# Patient Record
Sex: MALE | Race: WHITE | Employment: UNEMPLOYED | URBAN - METROPOLITAN AREA
[De-identification: names, ages, dates, MRNs, and addresses within clinical notes are randomized per-mention and may not be internally consistent; named-entity substitution may affect disease eponyms.]

---

## 2023-10-12 ENCOUNTER — PHARMACY VISIT (OUTPATIENT)
Dept: PHARMACY | Facility: CLINIC | Age: 53
End: 2023-10-12
Payer: COMMERCIAL

## 2023-10-12 PROCEDURE — RXMED WILLOW AMBULATORY MEDICATION CHARGE

## 2023-11-10 ENCOUNTER — PHARMACY VISIT (OUTPATIENT)
Dept: PHARMACY | Facility: CLINIC | Age: 53
End: 2023-11-10
Payer: COMMERCIAL

## 2023-11-10 PROCEDURE — RXMED WILLOW AMBULATORY MEDICATION CHARGE

## 2024-05-14 ENCOUNTER — APPOINTMENT (OUTPATIENT)
Dept: RADIOLOGY | Facility: HOSPITAL | Age: 54
End: 2024-05-14

## 2024-05-14 ENCOUNTER — HOSPITAL ENCOUNTER (INPATIENT)
Facility: HOSPITAL | Age: 54
LOS: 7 days | Discharge: HOME | End: 2024-05-21
Attending: STUDENT IN AN ORGANIZED HEALTH CARE EDUCATION/TRAINING PROGRAM | Admitting: INTERNAL MEDICINE

## 2024-05-14 ENCOUNTER — APPOINTMENT (OUTPATIENT)
Dept: CARDIOLOGY | Facility: HOSPITAL | Age: 54
End: 2024-05-14

## 2024-05-14 DIAGNOSIS — I26.99 ACUTE PULMONARY EMBOLISM, UNSPECIFIED PULMONARY EMBOLISM TYPE, UNSPECIFIED WHETHER ACUTE COR PULMONALE PRESENT (MULTI): Primary | ICD-10-CM

## 2024-05-14 DIAGNOSIS — I10 HYPERTENSION, UNSPECIFIED TYPE: ICD-10-CM

## 2024-05-14 DIAGNOSIS — I27.82 CHRONIC PULMONARY EMBOLISM (MULTI): ICD-10-CM

## 2024-05-14 DIAGNOSIS — L85.3 DRY SKIN: ICD-10-CM

## 2024-05-14 DIAGNOSIS — Z79.899 LONG TERM CURRENT USE OF DIURETIC: ICD-10-CM

## 2024-05-14 DIAGNOSIS — Z79.01 ON CONTINUOUS ORAL ANTICOAGULATION: ICD-10-CM

## 2024-05-14 DIAGNOSIS — S91.301A OPEN WOUND OF RIGHT FOOT, INITIAL ENCOUNTER: ICD-10-CM

## 2024-05-14 LAB
ALBUMIN SERPL-MCNC: 3.5 G/DL (ref 3.5–5)
ALP BLD-CCNC: 108 U/L (ref 35–125)
ALT SERPL-CCNC: 16 U/L (ref 5–40)
ANION GAP SERPL CALC-SCNC: 14 MMOL/L
APTT PPP: 27.1 SECONDS (ref 22–32.5)
APTT PPP: 39 SECONDS (ref 22–32.5)
AST SERPL-CCNC: 19 U/L (ref 5–40)
BASOPHILS # BLD AUTO: 0.03 X10*3/UL (ref 0–0.1)
BASOPHILS NFR BLD AUTO: 0.4 %
BILIRUB SERPL-MCNC: 0.7 MG/DL (ref 0.1–1.2)
BUN SERPL-MCNC: 14 MG/DL (ref 8–25)
CALCIUM SERPL-MCNC: 8.7 MG/DL (ref 8.5–10.4)
CHLORIDE SERPL-SCNC: 101 MMOL/L (ref 97–107)
CO2 SERPL-SCNC: 21 MMOL/L (ref 24–31)
CREAT SERPL-MCNC: 1 MG/DL (ref 0.4–1.6)
EGFRCR SERPLBLD CKD-EPI 2021: 90 ML/MIN/1.73M*2
EOSINOPHIL # BLD AUTO: 0.23 X10*3/UL (ref 0–0.7)
EOSINOPHIL NFR BLD AUTO: 2.9 %
ERYTHROCYTE [DISTWIDTH] IN BLOOD BY AUTOMATED COUNT: 13.5 % (ref 11.5–14.5)
ERYTHROCYTE [DISTWIDTH] IN BLOOD BY AUTOMATED COUNT: 13.9 % (ref 11.5–14.5)
EST. AVERAGE GLUCOSE BLD GHB EST-MCNC: 260 MG/DL
GLUCOSE BLD MANUAL STRIP-MCNC: 282 MG/DL (ref 74–99)
GLUCOSE BLD MANUAL STRIP-MCNC: 343 MG/DL (ref 74–99)
GLUCOSE SERPL-MCNC: 383 MG/DL (ref 65–99)
HBA1C MFR BLD: 10.7 %
HCT VFR BLD AUTO: 40.8 % (ref 41–52)
HCT VFR BLD AUTO: 42.2 % (ref 41–52)
HGB BLD-MCNC: 13.7 G/DL (ref 13.5–17.5)
HGB BLD-MCNC: 14.1 G/DL (ref 13.5–17.5)
IMM GRANULOCYTES # BLD AUTO: 0.03 X10*3/UL (ref 0–0.7)
IMM GRANULOCYTES NFR BLD AUTO: 0.4 % (ref 0–0.9)
LYMPHOCYTES # BLD AUTO: 1.27 X10*3/UL (ref 1.2–4.8)
LYMPHOCYTES NFR BLD AUTO: 15.8 %
MCH RBC QN AUTO: 29 PG (ref 26–34)
MCH RBC QN AUTO: 29.1 PG (ref 26–34)
MCHC RBC AUTO-ENTMCNC: 33.4 G/DL (ref 32–36)
MCHC RBC AUTO-ENTMCNC: 33.6 G/DL (ref 32–36)
MCV RBC AUTO: 86 FL (ref 80–100)
MCV RBC AUTO: 87 FL (ref 80–100)
MONOCYTES # BLD AUTO: 0.57 X10*3/UL (ref 0.1–1)
MONOCYTES NFR BLD AUTO: 7.1 %
NEUTROPHILS # BLD AUTO: 5.92 X10*3/UL (ref 1.2–7.7)
NEUTROPHILS NFR BLD AUTO: 73.4 %
NRBC BLD-RTO: 0 /100 WBCS (ref 0–0)
NRBC BLD-RTO: 0 /100 WBCS (ref 0–0)
NT-PROBNP SERPL-MCNC: 459 PG/ML (ref 0–138)
PLATELET # BLD AUTO: 221 X10*3/UL (ref 150–450)
PLATELET # BLD AUTO: 238 X10*3/UL (ref 150–450)
POTASSIUM SERPL-SCNC: 4.2 MMOL/L (ref 3.4–5.1)
PROT SERPL-MCNC: 7.1 G/DL (ref 5.9–7.9)
RBC # BLD AUTO: 4.72 X10*6/UL (ref 4.5–5.9)
RBC # BLD AUTO: 4.85 X10*6/UL (ref 4.5–5.9)
SARS-COV-2 RNA RESP QL NAA+PROBE: NOT DETECTED
SODIUM SERPL-SCNC: 136 MMOL/L (ref 133–145)
TROPONIN T SERPL-MCNC: 33 NG/L
TROPONIN T SERPL-MCNC: 63 NG/L
TROPONIN T SERPL-MCNC: 67 NG/L
WBC # BLD AUTO: 8 X10*3/UL (ref 4.4–11.3)
WBC # BLD AUTO: 8.1 X10*3/UL (ref 4.4–11.3)

## 2024-05-14 PROCEDURE — 0HDMXZZ EXTRACTION OF RIGHT FOOT SKIN, EXTERNAL APPROACH: ICD-10-PCS | Performed by: STUDENT IN AN ORGANIZED HEALTH CARE EDUCATION/TRAINING PROGRAM

## 2024-05-14 PROCEDURE — 99291 CRITICAL CARE FIRST HOUR: CPT | Mod: 25 | Performed by: STUDENT IN AN ORGANIZED HEALTH CARE EDUCATION/TRAINING PROGRAM

## 2024-05-14 PROCEDURE — 2550000001 HC RX 255 CONTRASTS: Performed by: STUDENT IN AN ORGANIZED HEALTH CARE EDUCATION/TRAINING PROGRAM

## 2024-05-14 PROCEDURE — 96374 THER/PROPH/DIAG INJ IV PUSH: CPT

## 2024-05-14 PROCEDURE — 2500000005 HC RX 250 GENERAL PHARMACY W/O HCPCS: Performed by: INTERNAL MEDICINE

## 2024-05-14 PROCEDURE — 84484 ASSAY OF TROPONIN QUANT: CPT | Performed by: STUDENT IN AN ORGANIZED HEALTH CARE EDUCATION/TRAINING PROGRAM

## 2024-05-14 PROCEDURE — 2500000004 HC RX 250 GENERAL PHARMACY W/ HCPCS (ALT 636 FOR OP/ED)

## 2024-05-14 PROCEDURE — 36415 COLL VENOUS BLD VENIPUNCTURE: CPT | Performed by: STUDENT IN AN ORGANIZED HEALTH CARE EDUCATION/TRAINING PROGRAM

## 2024-05-14 PROCEDURE — 93970 EXTREMITY STUDY: CPT

## 2024-05-14 PROCEDURE — 2020000001 HC ICU ROOM DAILY

## 2024-05-14 PROCEDURE — 85730 THROMBOPLASTIN TIME PARTIAL: CPT | Performed by: STUDENT IN AN ORGANIZED HEALTH CARE EDUCATION/TRAINING PROGRAM

## 2024-05-14 PROCEDURE — 99291 CRITICAL CARE FIRST HOUR: CPT | Performed by: INTERNAL MEDICINE

## 2024-05-14 PROCEDURE — 85027 COMPLETE CBC AUTOMATED: CPT | Performed by: STUDENT IN AN ORGANIZED HEALTH CARE EDUCATION/TRAINING PROGRAM

## 2024-05-14 PROCEDURE — 93005 ELECTROCARDIOGRAM TRACING: CPT

## 2024-05-14 PROCEDURE — 93971 EXTREMITY STUDY: CPT | Performed by: RADIOLOGY

## 2024-05-14 PROCEDURE — 82947 ASSAY GLUCOSE BLOOD QUANT: CPT

## 2024-05-14 PROCEDURE — 85025 COMPLETE CBC W/AUTO DIFF WBC: CPT | Performed by: STUDENT IN AN ORGANIZED HEALTH CARE EDUCATION/TRAINING PROGRAM

## 2024-05-14 PROCEDURE — 83880 ASSAY OF NATRIURETIC PEPTIDE: CPT | Performed by: STUDENT IN AN ORGANIZED HEALTH CARE EDUCATION/TRAINING PROGRAM

## 2024-05-14 PROCEDURE — 71275 CT ANGIOGRAPHY CHEST: CPT | Performed by: RADIOLOGY

## 2024-05-14 PROCEDURE — 2500000002 HC RX 250 W HCPCS SELF ADMINISTERED DRUGS (ALT 637 FOR MEDICARE OP, ALT 636 FOR OP/ED): Performed by: INTERNAL MEDICINE

## 2024-05-14 PROCEDURE — 2500000004 HC RX 250 GENERAL PHARMACY W/ HCPCS (ALT 636 FOR OP/ED): Performed by: STUDENT IN AN ORGANIZED HEALTH CARE EDUCATION/TRAINING PROGRAM

## 2024-05-14 PROCEDURE — 71275 CT ANGIOGRAPHY CHEST: CPT

## 2024-05-14 PROCEDURE — 87635 SARS-COV-2 COVID-19 AMP PRB: CPT | Performed by: STUDENT IN AN ORGANIZED HEALTH CARE EDUCATION/TRAINING PROGRAM

## 2024-05-14 PROCEDURE — 80053 COMPREHEN METABOLIC PANEL: CPT | Performed by: STUDENT IN AN ORGANIZED HEALTH CARE EDUCATION/TRAINING PROGRAM

## 2024-05-14 PROCEDURE — 83036 HEMOGLOBIN GLYCOSYLATED A1C: CPT | Performed by: INTERNAL MEDICINE

## 2024-05-14 RX ORDER — HEPARIN SODIUM 5000 [USP'U]/ML
10000 INJECTION, SOLUTION INTRAVENOUS; SUBCUTANEOUS ONCE
Status: COMPLETED | OUTPATIENT
Start: 2024-05-14 | End: 2024-05-14

## 2024-05-14 RX ORDER — HEPARIN SODIUM 5000 [USP'U]/ML
5000-10000 INJECTION, SOLUTION INTRAVENOUS; SUBCUTANEOUS AS NEEDED
Status: DISCONTINUED | OUTPATIENT
Start: 2024-05-14 | End: 2024-05-14

## 2024-05-14 RX ORDER — DEXTROSE 50 % IN WATER (D50W) INTRAVENOUS SYRINGE
25
Status: DISCONTINUED | OUTPATIENT
Start: 2024-05-14 | End: 2024-05-21 | Stop reason: HOSPADM

## 2024-05-14 RX ORDER — INSULIN GLARGINE 100 [IU]/ML
20 INJECTION, SOLUTION SUBCUTANEOUS NIGHTLY
Status: DISCONTINUED | OUTPATIENT
Start: 2024-05-14 | End: 2024-05-15

## 2024-05-14 RX ORDER — HEPARIN SODIUM 10000 [USP'U]/100ML
0-4500 INJECTION, SOLUTION INTRAVENOUS CONTINUOUS
Status: DISCONTINUED | OUTPATIENT
Start: 2024-05-14 | End: 2024-05-14

## 2024-05-14 RX ORDER — DEXTROSE 50 % IN WATER (D50W) INTRAVENOUS SYRINGE
12.5
Status: DISCONTINUED | OUTPATIENT
Start: 2024-05-14 | End: 2024-05-21 | Stop reason: HOSPADM

## 2024-05-14 RX ORDER — HEPARIN SODIUM 5000 [USP'U]/ML
5000-10000 INJECTION, SOLUTION INTRAVENOUS; SUBCUTANEOUS AS NEEDED
Status: DISCONTINUED | OUTPATIENT
Start: 2024-05-14 | End: 2024-05-21 | Stop reason: SDUPTHER

## 2024-05-14 RX ORDER — HEPARIN SODIUM 10000 [USP'U]/100ML
0-4500 INJECTION, SOLUTION INTRAVENOUS CONTINUOUS
Status: DISCONTINUED | OUTPATIENT
Start: 2024-05-14 | End: 2024-05-21

## 2024-05-14 RX ORDER — FUROSEMIDE 10 MG/ML
20 INJECTION INTRAMUSCULAR; INTRAVENOUS ONCE
Status: COMPLETED | OUTPATIENT
Start: 2024-05-14 | End: 2024-05-14

## 2024-05-14 RX ORDER — FUROSEMIDE 20 MG/1
20 TABLET ORAL DAILY
COMMUNITY

## 2024-05-14 RX ADMIN — Medication 4 L/MIN: at 20:00

## 2024-05-14 RX ADMIN — FUROSEMIDE 20 MG: 10 INJECTION, SOLUTION INTRAMUSCULAR; INTRAVENOUS at 21:16

## 2024-05-14 RX ADMIN — INSULIN GLARGINE 20 UNITS: 100 INJECTION, SOLUTION SUBCUTANEOUS at 20:38

## 2024-05-14 RX ADMIN — IOHEXOL 75 ML: 350 INJECTION, SOLUTION INTRAVENOUS at 11:17

## 2024-05-14 RX ADMIN — HEPARIN SODIUM 2000 UNITS/HR: 10000 INJECTION, SOLUTION INTRAVENOUS at 12:32

## 2024-05-14 RX ADMIN — INSULIN HUMAN 2 UNITS: 100 INJECTION, SOLUTION PARENTERAL at 20:38

## 2024-05-14 RX ADMIN — HEPARIN SODIUM 10000 UNITS: 5000 INJECTION, SOLUTION INTRAVENOUS; SUBCUTANEOUS at 12:29

## 2024-05-14 RX ADMIN — Medication 4 L/MIN: at 18:20

## 2024-05-14 SDOH — SOCIAL STABILITY: SOCIAL INSECURITY: WERE YOU ABLE TO COMPLETE ALL THE BEHAVIORAL HEALTH SCREENINGS?: YES

## 2024-05-14 SDOH — SOCIAL STABILITY: SOCIAL INSECURITY: DOES ANYONE TRY TO KEEP YOU FROM HAVING/CONTACTING OTHER FRIENDS OR DOING THINGS OUTSIDE YOUR HOME?: NO

## 2024-05-14 SDOH — SOCIAL STABILITY: SOCIAL INSECURITY: HAVE YOU HAD THOUGHTS OF HARMING ANYONE ELSE?: NO

## 2024-05-14 SDOH — SOCIAL STABILITY: SOCIAL INSECURITY: HAS ANYONE EVER THREATENED TO HURT YOUR FAMILY OR YOUR PETS?: NO

## 2024-05-14 SDOH — SOCIAL STABILITY: SOCIAL INSECURITY: DO YOU FEEL ANYONE HAS EXPLOITED OR TAKEN ADVANTAGE OF YOU FINANCIALLY OR OF YOUR PERSONAL PROPERTY?: NO

## 2024-05-14 SDOH — SOCIAL STABILITY: SOCIAL INSECURITY: DO YOU FEEL UNSAFE GOING BACK TO THE PLACE WHERE YOU ARE LIVING?: NO

## 2024-05-14 SDOH — SOCIAL STABILITY: SOCIAL INSECURITY: HAVE YOU HAD ANY THOUGHTS OF HARMING ANYONE ELSE?: NO

## 2024-05-14 SDOH — SOCIAL STABILITY: SOCIAL INSECURITY: ARE YOU OR HAVE YOU BEEN THREATENED OR ABUSED PHYSICALLY, EMOTIONALLY, OR SEXUALLY BY ANYONE?: NO

## 2024-05-14 SDOH — SOCIAL STABILITY: SOCIAL INSECURITY: ABUSE: ADULT

## 2024-05-14 SDOH — SOCIAL STABILITY: SOCIAL INSECURITY: ARE THERE ANY APPARENT SIGNS OF INJURIES/BEHAVIORS THAT COULD BE RELATED TO ABUSE/NEGLECT?: NO

## 2024-05-14 ASSESSMENT — PATIENT HEALTH QUESTIONNAIRE - PHQ9
1. LITTLE INTEREST OR PLEASURE IN DOING THINGS: NOT AT ALL
2. FEELING DOWN, DEPRESSED OR HOPELESS: NOT AT ALL
SUM OF ALL RESPONSES TO PHQ9 QUESTIONS 1 & 2: 0

## 2024-05-14 ASSESSMENT — ENCOUNTER SYMPTOMS
EYE REDNESS: 0
SHORTNESS OF BREATH: 1
APPETITE CHANGE: 0
EYE PAIN: 0
FEVER: 0
COUGH: 0
CHEST TIGHTNESS: 0
WHEEZING: 1
FATIGUE: 0
CHILLS: 0
UNEXPECTED WEIGHT CHANGE: 0
EYE ITCHING: 0

## 2024-05-14 ASSESSMENT — ACTIVITIES OF DAILY LIVING (ADL)
ADEQUATE_TO_COMPLETE_ADL: YES
JUDGMENT_ADEQUATE_SAFELY_COMPLETE_DAILY_ACTIVITIES: YES
TOILETING: INDEPENDENT
HEARING - RIGHT EAR: FUNCTIONAL
BATHING: INDEPENDENT
GROOMING: INDEPENDENT
DRESSING YOURSELF: INDEPENDENT
WALKS IN HOME: INDEPENDENT
LACK_OF_TRANSPORTATION: NO
FEEDING YOURSELF: INDEPENDENT
PATIENT'S MEMORY ADEQUATE TO SAFELY COMPLETE DAILY ACTIVITIES?: YES
HEARING - LEFT EAR: FUNCTIONAL

## 2024-05-14 ASSESSMENT — COGNITIVE AND FUNCTIONAL STATUS - GENERAL
MOBILITY SCORE: 24
PATIENT BASELINE BEDBOUND: NO
DAILY ACTIVITIY SCORE: 24

## 2024-05-14 ASSESSMENT — LIFESTYLE VARIABLES
TOTAL SCORE: 0
EVER FELT BAD OR GUILTY ABOUT YOUR DRINKING: NO
AUDIT-C TOTAL SCORE: 0
HOW MANY STANDARD DRINKS CONTAINING ALCOHOL DO YOU HAVE ON A TYPICAL DAY: PATIENT DOES NOT DRINK
HOW OFTEN DO YOU HAVE A DRINK CONTAINING ALCOHOL: NEVER
PRESCIPTION_ABUSE_PAST_12_MONTHS: NO
HOW OFTEN DO YOU HAVE 6 OR MORE DRINKS ON ONE OCCASION: NEVER
HAVE YOU EVER FELT YOU SHOULD CUT DOWN ON YOUR DRINKING: NO
HAVE PEOPLE ANNOYED YOU BY CRITICIZING YOUR DRINKING: NO
EVER HAD A DRINK FIRST THING IN THE MORNING TO STEADY YOUR NERVES TO GET RID OF A HANGOVER: NO
SKIP TO QUESTIONS 9-10: 1
AUDIT-C TOTAL SCORE: 0
SUBSTANCE_ABUSE_PAST_12_MONTHS: NO

## 2024-05-14 ASSESSMENT — PAIN SCALES - GENERAL
PAINLEVEL_OUTOF10: 0 - NO PAIN
PAINLEVEL_OUTOF10: 0 - NO PAIN

## 2024-05-14 ASSESSMENT — PAIN - FUNCTIONAL ASSESSMENT
PAIN_FUNCTIONAL_ASSESSMENT: 0-10
PAIN_FUNCTIONAL_ASSESSMENT: 0-10

## 2024-05-14 ASSESSMENT — COLUMBIA-SUICIDE SEVERITY RATING SCALE - C-SSRS
1. IN THE PAST MONTH, HAVE YOU WISHED YOU WERE DEAD OR WISHED YOU COULD GO TO SLEEP AND NOT WAKE UP?: NO
2. HAVE YOU ACTUALLY HAD ANY THOUGHTS OF KILLING YOURSELF?: NO
6. HAVE YOU EVER DONE ANYTHING, STARTED TO DO ANYTHING, OR PREPARED TO DO ANYTHING TO END YOUR LIFE?: NO

## 2024-05-14 NOTE — H&P
History Of Present Illness  Mina Huitron is a 53 y.o. male with VTE, HTN, DLP, DM with neuropathy and diabetic foot, chronic anemia, obesity, who presented with SOB x few days. In the ED work up revealed b/l PE. PERT team was consulted who recommended heparin drip and ICU monitoring. Subsequently is being admitted to ICU.   States his SOB started 3 days ago, sudden onset, progressive. Mostly with activity, but YANCEY after 10 feet or climbing a few steps. Associated with wheezing, palpitation and LH. No orthopnea, PND. +ve LE edema which is chronic without any recent worsening. Currently not on Warfarin (only received 6 months after his first episode). Denies recent travel or illness.    Past Medical History  VTE, HTN, DLP, DM with neuropathy and diabetic foot, chronic anemia, obesity  Surgical History  LE wound surgery, toe amputation   Social History  Never smoked, worked as a , no known exposures  Family History  +ve for HTN, DM, CHF, cancer   Allergies  Patient has no known allergies.  Review of Systems   Constitutional:  Negative for appetite change, chills, fatigue, fever and unexpected weight change.   HENT:  Negative for congestion, postnasal drip, rhinorrhea, sinus pressure, sinus pain and sore throat.    Eyes:  Negative for pain, redness, itching and visual disturbance.   Respiratory:  Positive for shortness of breath and wheezing. Negative for cough and chest tightness.    Cardiovascular:  Positive for palpitations and leg swelling. Negative for chest pain.   Gastrointestinal:  Negative for abdominal pain, constipation, diarrhea, nausea and vomiting.   Genitourinary:  Negative for dysuria, flank pain and hematuria.   Musculoskeletal:  Negative for arthralgias, back pain, myalgias and neck pain.   Skin:  Positive for rash and wound. Negative for pallor.   Neurological:  Positive for dizziness and light-headedness. Negative for seizures, syncope and headaches.   Psychiatric/Behavioral:  Negative  for confusion and dysphoric mood. The patient is not nervous/anxious.    Scheduled medications  oxygen, , inhalation, Continuous - Inhalation    Continuous medications  heparin, 0-4,500 Units/hr, Last Rate: 2,000 Units/hr (05/14/24 1232)    PRN medications  PRN medications: heparin (porcine)   Physical Exam  Constitutional:       General: He is not in acute distress.     Appearance: He is obese. He is not ill-appearing or toxic-appearing.   HENT:      Head: Normocephalic.      Nose:      Comments: On 5L via NC     Mouth/Throat:      Mouth: Mucous membranes are moist.      Comments: Mallampati 3.   Eyes:      General: No scleral icterus.     Extraocular Movements: Extraocular movements intact.      Pupils: Pupils are equal, round, and reactive to light.   Cardiovascular:      Rate and Rhythm: Regular rhythm. Tachycardia present.      Heart sounds: No murmur heard.     No friction rub. No gallop.   Pulmonary:      Effort: Pulmonary effort is normal. No respiratory distress.      Breath sounds: Normal breath sounds. No wheezing or rales.   Abdominal:      General: There is no distension.      Palpations: Abdomen is soft.      Tenderness: There is no abdominal tenderness.   Musculoskeletal:         General: No tenderness. Normal range of motion.      Cervical back: Normal range of motion and neck supple. No rigidity or tenderness.      Right lower leg: Edema (2+) present.      Left lower leg: Edema (2+) present.   Lymphadenopathy:      Cervical: No cervical adenopathy.   Skin:     General: Skin is warm and dry.      Findings: Rash (chronic discoloration b/l LE) present.   Neurological:      General: No focal deficit present.      Mental Status: He is alert and oriented to person, place, and time.      Cranial Nerves: No cranial nerve deficit.      Motor: No weakness.   Psychiatric:         Mood and Affect: Mood normal.         Behavior: Behavior normal.   Last Recorded Vitals  Blood pressure 140/74, pulse (!) 103,  "temperature 36.9 °C (98.4 °F), temperature source Oral, resp. rate (!) 31, height 1.854 m (6' 1\"), weight 148 kg (326 lb 4.5 oz), SpO2 94%.  Relevant Results  Results for orders placed or performed during the hospital encounter of 05/14/24 (from the past 24 hour(s))   CBC and Auto Differential   Result Value Ref Range    WBC 8.1 4.4 - 11.3 x10*3/uL    nRBC 0.0 0.0 - 0.0 /100 WBCs    RBC 4.72 4.50 - 5.90 x10*6/uL    Hemoglobin 13.7 13.5 - 17.5 g/dL    Hematocrit 40.8 (L) 41.0 - 52.0 %    MCV 86 80 - 100 fL    MCH 29.0 26.0 - 34.0 pg    MCHC 33.6 32.0 - 36.0 g/dL    RDW 13.5 11.5 - 14.5 %    Platelets 221 150 - 450 x10*3/uL    Neutrophils % 73.4 40.0 - 80.0 %    Immature Granulocytes %, Automated 0.4 0.0 - 0.9 %    Lymphocytes % 15.8 13.0 - 44.0 %    Monocytes % 7.1 2.0 - 10.0 %    Eosinophils % 2.9 0.0 - 6.0 %    Basophils % 0.4 0.0 - 2.0 %    Neutrophils Absolute 5.92 1.20 - 7.70 x10*3/uL    Immature Granulocytes Absolute, Automated 0.03 0.00 - 0.70 x10*3/uL    Lymphocytes Absolute 1.27 1.20 - 4.80 x10*3/uL    Monocytes Absolute 0.57 0.10 - 1.00 x10*3/uL    Eosinophils Absolute 0.23 0.00 - 0.70 x10*3/uL    Basophils Absolute 0.03 0.00 - 0.10 x10*3/uL   Comprehensive metabolic panel   Result Value Ref Range    Glucose 383 (H) 65 - 99 mg/dL    Sodium 136 133 - 145 mmol/L    Potassium 4.2 3.4 - 5.1 mmol/L    Chloride 101 97 - 107 mmol/L    Bicarbonate 21 (L) 24 - 31 mmol/L    Urea Nitrogen 14 8 - 25 mg/dL    Creatinine 1.00 0.40 - 1.60 mg/dL    eGFR 90 >60 mL/min/1.73m*2    Calcium 8.7 8.5 - 10.4 mg/dL    Albumin 3.5 3.5 - 5.0 g/dL    Alkaline Phosphatase 108 35 - 125 U/L    Total Protein 7.1 5.9 - 7.9 g/dL    AST 19 5 - 40 U/L    Bilirubin, Total 0.7 0.1 - 1.2 mg/dL    ALT 16 5 - 40 U/L    Anion Gap 14 <=19 mmol/L   Sars-CoV-2 PCR   Result Value Ref Range    Coronavirus 2019, PCR Not Detected Not Detected   Serial Troponin, Initial ([facility])   Result Value Ref Range    Troponin T, High Sensitivity 33 (HH) <=14 ng/L   NT " Pro-BNP   Result Value Ref Range    PROBNP 459 (H) 0 - 138 pg/mL   ECG 12 lead   Result Value Ref Range    Ventricular Rate 110 BPM    Atrial Rate 110 BPM    LA Interval 146 ms    QRS Duration 92 ms    QT Interval 350 ms    QTC Calculation(Bazett) 473 ms    P Axis 50 degrees    R Axis 64 degrees    T Axis 32 degrees    QRS Count 18 beats    Q Onset 219 ms    P Onset 146 ms    P Offset 205 ms    T Offset 394 ms    QTC Fredericia 428 ms   Serial Troponin, 2 Hour ([facility])   Result Value Ref Range    Troponin T, High Sensitivity 63 (HH) <=14 ng/L   aPTT   Result Value Ref Range    aPTT 27.1 22.0 - 32.5 seconds   Vascular US lower extremity venous duplex bilateral    Result Date: 5/14/2024  Interpreted By:  Phil Tejeda, STUDY: [facility] US LOWER EXTREMITY VENOUS DUPLEX BILATERAL  5/14/2024 5:10 pm   INDICATION: 54 y/o   M with  Signs/Symptoms:bilateral PEs. LMP:  Unknown.   COMPARISON: None.   ACCESSION NUMBER(S): [id number]   ORDERING CLINICIAN: NICKOLAS BOYKIN   TECHNIQUE: Routine ultrasound of the  bilateral lower extremities was performed with duplex Doppler (color and spectral) evaluation.   Static images were obtained for remote interpretation.     FINDINGS: Right lower extremity: All visualized deep veins in the right lower extremity are patent with no thrombosis. The veins are compressible and demonstrate normal augmentation.   Left lower extremity: The junction of the greater saphenous and common femoral veins is not compressible, which may be due to nonocclusive thrombosis. All other deep veins in the femoropopliteal segment of the left lower extremity are patent and compressible with no evidence of thrombosis. The left calf veins could not be identified.       Questionable nonocclusive thrombosis at the junction of the left greater saphenous vein and common femoral vein.   No DVT in the right lower extremity.   MACRO: None   Signed by: Phil Tejeda 5/14/2024 5:49 PM Dictation workstation:   [id number]    CT  angio chest for pulmonary embolism    Result Date: 5/14/2024  Interpreted By:  Cameron Navarro, STUDY: CT ANGIO CHEST FOR PULMONARY EMBOLISM; 5/14/2024 11:16 am   INDICATION: Signs/Symptoms:short of breath, tachycardic, history of PE.   COMPARISON: None   ACCESSION NUMBER(S): [id number]   ORDERING CLINICIAN: NICKOLAS BOYKIN   TECHNIQUE: Contiguous axial images of the thorax were obtained from the level of the thoracic inlet through the lung bases. 3-D MIPS were created, processed and reviewed on a separate workstation. 100 ml of Omnipaque 350 was utilized. All CT examinations are performed with 1 or more of the following dose reduction techniques: Automated exposure control, adjustment of mA and/or kv according to patient's size, or use of iterative reconstruction techniques.   FINDINGS: The thyroid gland is unremarkable.   There is adequate contrast opacification of the pulmonary arterial vasculature. Extensive filling defects consistent with pulmonary emboli are seen bilaterally. There is prominent pulmonary embolus at the distal main right and distal main left pulmonary emboli with extensive thrombus extending throughout the majority of segmental and subsegmental branches bilaterally. However, no evidence for right heart strain or significant septal deviation.   The heart size is within normal limits.  No pericardial effusion is identified. The aorta and pulmonary arteries are normal in caliber.   There are no pathologically enlarged mediastinal, hilar, or axillary lymph nodes are seen.   The trachea and mainstem bronchi are patent. No suspicious pulmonary nodules are seen. No significant consolidation. There is no evidence of pneumothorax or pleural effusion.   The visualized osseous structures are intact.   Limited images through the upper abdomen are unremarkable.       1. (+) PE.  Extensive bilateral pulmonary emboli. No evidence of right heart strain. 2. No significant airspace opacity or  consolidation.     Signed by: Cameron Navarro 5/14/2024 11:49 AM Dictation workstation:   [id number]    ECG 12 lead    Result Date: 5/14/2024  Sinus tachycardia Otherwise normal ECG No previous ECGs available    Assessment/Plan   Principal Problem:    Acute pulmonary embolism, unspecified pulmonary embolism type, unspecified whether acute cor pulmonale present (Multi)  53 YOM with VTE, HTN, DLP, DM with neuropathy and diabetic foot, chronic anemia, obesity, who presented with SOB x few days. In the ED work up revealed b/l PE. PERT team was consulted who recommended heparin drip and ICU monitoring. Subsequently is being admitted to ICU.     Neuro: no acute issues, h/o diabetic neuropathy. Not on any pain medication.      Supportive measures      Pain management as needed    CV: HTN, DLP. Now with tachycardia from PE, improving, BP acceptable range      Hold home BP medications      Can continue diuresis       Continue to monitor    Pulmonary: h/o VTE, now with acute PE c/b acute hypoxic respiratory failure. With significant clot burden consistent with submassive PE (b/l PE, elevated BNP, troponin, +ve LE DVT, tachycardia, and hypoxia)      PERT team contacted, not an candidate for thrombectomy at this time      continue heparin drip      Supplemental O2, wean off as tolerates      Will need lifelong anticoagulation      2D echo    : no acute issues      Is/Os      Daily RFP      Treat electrolytes abnormalities as indicated    GI: no acute issues      NPO for now        Endo: DM with elevated BS      Will continue Lantus, dose cut to 1/2 home dose while NPO      SSI      Hypoglycemic protocol    Hem/Onc: h/o chronic anemia, Hb WNL      Continue to monitor with daily CBC    ID: no findings suggestive of infection      Continue to monitor for S&S infection    This was a critical care visit for pulmonary embolism with hypoxic respiratory failure. Total time 56 min.    Jon Carney MD

## 2024-05-14 NOTE — LETTER
May 21, 2024     Patient: Mina Huitron   YOB: 1970   Date of Visit: 5/14/2024       To Whom It May Concern:    Mina Huitron was admitted to [facility] from 5/14/2024 - 5/21/2024?. Please excuse Mina for his absence from work. He may return to work with no restrictions.     If you have any questions or concerns, please contact our office at [phone].          Sincerely,       Rosalva Sinha CNP

## 2024-05-14 NOTE — SIGNIFICANT EVENT
PULMONARY EMBOLISM RESPONSE TEAM (PERT) NOTE    This note represents a summary of a virtual evaluation by the pulmonary embolism response team.  Suggestions and impression are summarized from the initial virtual encounter and discussion with the referring medical team. These suggestions supplement but should not be a substitute for bedside evaluation and management by the attending of record.     PERT Members on the Call:  Critical Care Attending: Dr. Guicho WALSH Interventional Cardiology Attending: Dr. Elias Damico  Vascular Medicine Attending: Dr. Opal Jerome    Brief Clinical Summary:  Mina Huitron is a 53 y.o. male with PMHx significant for diabetes c/b Charcot neuroarthropathy, HTN, and previous bilateral PE (not on current anticoagulation) presenting with shortness of breath progressively worsening over the last few days, without chest pain, cough cold symptoms, fevers chills. He has no known history of pulmonary disease, and at baseline is on room air. He underwent CT PE which showed bilateral distal mainstem clot burden with extension into the segmental branches. He was requiring 5L NC in the ED, saturating in the mid 90s, and also was noted to be tachycardic to the 110s, but without hypotension or other hemodynamic instability. Patient notably had previously been diagnosed with bilateral PE in 2019, treated with anticoagulation for 6 months, after which patient reported he was told by medical provider he no longer needed to be on anticoagulation. Review of documentation suggests that the plan was to keep patient on coumadin therapy for 6 months and then discontinue, but that it was discussed with him that if he experienced another PE he may need coumadin for life.     Vital Signs  -150/70s, , RR 22, afebrile, saturating 95% on 5L NC    Laboratory  Trop 33 --> 63    Lactate pending    PESI Score:  103, consistent with moderate risk PE (Class III)     CT Scan  reviewed:  Clot burden: bilateral distal main disease, with extension bilateral into the segmental branches   RV/LV ratio a little less than 1 by CT scan    TTE reviewed (if available):  pending    Venous duplex (if available):  pending    Contraindications to anticoagulation: not apparent  Contraindications to thrombolytics: not apparent     Initial Suggestions/Plans:  Plan for admission to the ICU at current facility, and continued medical management as current (continue heparin drip). Would recommend completed work up with TTE and DVT ultrasounds bilateral extremities. Consensus plan is that we would continue to monitor hemodynamic stability; if patient decompensates, would recommend reconvening transfer center at that time for consideration of next steps.     To re conference the PERT team please call the  Transfer Center at [phone].

## 2024-05-14 NOTE — ED TRIAGE NOTES
Pt has been feeling more short of breath for 3 days. Thinks he was congested. Was 81% on room air. Up to 100% on NC

## 2024-05-14 NOTE — ED PROVIDER NOTES
HPI   Chief Complaint   Patient presents with    Shortness of Breath       This is a 53-year-old male with a past medical history of type 2 diabetes on insulin, DVT and PE in the past not currently on anticoagulation presenting to the ED for evaluation of shortness of breath worsening over the past few days.  He denies any chest pain associate with this, or any cough or congestion, fevers or chills.  He states that shortness of breath got worse last night and this morning which is why he was coming to the ED today.  He states that he is hardly able to function at home today because of his shortness of breath on exertion.      History provided by:  Patient   used: No                        [place] Coma Scale Score: 15                     Patient History   No past medical history on file.  No past surgical history on file.  No family history on file.  Social History     Tobacco Use    Smoking status: Not on file    Smokeless tobacco: Not on file   Substance Use Topics    Alcohol use: Not on file    Drug use: Not on file       Physical Exam   ED Triage Vitals   Temperature Heart Rate Respirations BP   05/14/24 0948 05/14/24 0945 05/14/24 0945 05/14/24 0945   36.9 °C (98.4 °F) (!) 114 14 170/88      Pulse Ox Temp Source Heart Rate Source Patient Position   05/14/24 0945 05/14/24 0948 05/14/24 0948 05/14/24 0948   (!) 87 % Oral Monitor Sitting      BP Location FiO2 (%)     05/14/24 0948 --     Right arm        Physical Exam  General: well developed, well nourished, in no apparent distress  Eyes: sclera clear bilaterally, PERRL, EOMI  HENT: normocephalic, atraumatic. Pharynx without erythema or exudates, uvula midline.  CV: regular rate and rhythm, no murmur, no gallops, or rubs. radial and dorsalis pedis pulses +2/4 bilaterally  Resp: clear to ascultation bilaterally, no wheezes, rales, or rhonchi  GI: abdomen soft, nontender without rigidity or guarding, no peritoneal signs, abdomen is nondistended,  no masses palpated  MSK: No midline spinal tenderness, strength +5/5 to upper and lower extremities bilaterally, no swelling of the extremities.  Neuro: no focal deficits, CN2-12 intact. Sensation fully intact.  Psych: appropriate mood and affect, cooperative with exam  Skin: warm, dry, without evidence of rash or abrasions    ED Course & MDM   ED Course as of 05/14/24 2147   Tue May 14, 2024   8948 I spoke with the PE response team who do not feel the patient should be transferred for emergent thrombectomy to [place] [facility].  They recommended patient be admitted to the ICU on heparin and monitor for 24 to 48 hours before being sent to a lower level of care.    I spoke with the ICU attending who agrees to review the patient's case to see if he will except for ICU management here. [NT]      ED Course User Index  [NT] Fantasma Acevedo DO         Diagnoses as of 05/14/24 2147   Acute pulmonary embolism, unspecified pulmonary embolism type, unspecified whether acute cor pulmonale present (Multi)       Medical Decision Making  PMH: Type 2 diabetes on insulin, DVT and PE not currently on anticoagulation  History obtained: directly from patient   Social factors affecting disposition: none    On arrival to the emergency department he is hypoxemic at 87% on room air and tachycardic at 114 beats minute.  He has no infectious signs symptoms, no chest pain associated with his shortness of breath.  Given his history of PEs in the past will obtain CT angiogram of the chest to exclude this in addition to cardiac workup to assess for evidence of NSTEMI, CHF, will also assess for pneumonia or infectious cause of his shortness of breath.  He is not wheezing and has no history of COPD, is not a smoker for over 20 years, presentation is not consistent with COPD.    EKG interpretation shows sinus tachycardia with rate of 110 beats minute.  Normal axis.  QTc is 473 ms, .  No ST elevation or depression, no acute ischemic pattern.   No STEMI.  Tachycardic, otherwise normal EKG.    He was found to have minimally elevated cardiac enzymes, troponin 33 and .  CT angiogram of the chest shows extensive bilateral pulmonary emboli including within the right and left main pulmonary arteries, extensive thrombus extending throughout the majority of segmental and subsegmental branches bilaterally.  No obvious evidence of right heart strain on CT.    Heparin was ordered to be started for the patient.  I informed him of his results.  Consult placed to speak with the [facility] team to see if he is a candidate for thrombectomy.    They do not feel that he is severe enough to transfer emergently for thrombectomy and that he should be admitted to the ICU for 24 to 48 hours for further management and can be transferred emergently if necessary.  He was assessed by the ICU physician who accept the patient for treatment here.  He was admitted in stable condition on a heparin infusion for further management.    Procedure  Critical Care    Performed by: Fantasma Acevedo DO  Authorized by: Fantasma Acevedo DO    Critical care provider statement:     Critical care time (minutes):  45    Critical care time was exclusive of:  Separately billable procedures and treating other patients    Critical care was necessary to treat or prevent imminent or life-threatening deterioration of the following conditions:  Respiratory failure    Critical care was time spent personally by me on the following activities:  Ordering and review of laboratory studies, ordering and review of radiographic studies, pulse oximetry, review of old charts, evaluation of patient's response to treatment, discussions with consultants, development of treatment plan with patient or surrogate and obtaining history from patient or surrogate    Care discussed with: admitting provider      Care discussed with comment:  ICU consult and PE response team consult       Fantasma Acevedo DO  05/14/24  [id number]

## 2024-05-15 ENCOUNTER — APPOINTMENT (OUTPATIENT)
Dept: RADIOLOGY | Facility: HOSPITAL | Age: 54
End: 2024-05-15

## 2024-05-15 ENCOUNTER — APPOINTMENT (OUTPATIENT)
Dept: CARDIOLOGY | Facility: HOSPITAL | Age: 54
End: 2024-05-15

## 2024-05-15 LAB
ANION GAP SERPL CALC-SCNC: 11 MMOL/L
APTT PPP: 55.3 SECONDS (ref 22–32.5)
ATRIAL RATE: 110 BPM
BASOPHILS # BLD AUTO: 0.06 X10*3/UL (ref 0–0.1)
BASOPHILS NFR BLD AUTO: 0.8 %
BUN SERPL-MCNC: 15 MG/DL (ref 8–25)
CALCIUM SERPL-MCNC: 8.7 MG/DL (ref 8.5–10.4)
CHLORIDE SERPL-SCNC: 99 MMOL/L (ref 97–107)
CO2 SERPL-SCNC: 24 MMOL/L (ref 24–31)
CREAT SERPL-MCNC: 0.8 MG/DL (ref 0.4–1.6)
EGFRCR SERPLBLD CKD-EPI 2021: >90 ML/MIN/1.73M*2
EOSINOPHIL # BLD AUTO: 0.31 X10*3/UL (ref 0–0.7)
EOSINOPHIL NFR BLD AUTO: 4 %
ERYTHROCYTE [DISTWIDTH] IN BLOOD BY AUTOMATED COUNT: 13.9 % (ref 11.5–14.5)
GLUCOSE BLD MANUAL STRIP-MCNC: 259 MG/DL (ref 74–99)
GLUCOSE BLD MANUAL STRIP-MCNC: 273 MG/DL (ref 74–99)
GLUCOSE BLD MANUAL STRIP-MCNC: 281 MG/DL (ref 74–99)
GLUCOSE BLD MANUAL STRIP-MCNC: 324 MG/DL (ref 74–99)
GLUCOSE BLD MANUAL STRIP-MCNC: 329 MG/DL (ref 74–99)
GLUCOSE BLD MANUAL STRIP-MCNC: 411 MG/DL (ref 74–99)
GLUCOSE SERPL-MCNC: 320 MG/DL (ref 65–99)
HCT VFR BLD AUTO: 41.2 % (ref 41–52)
HGB BLD-MCNC: 13.5 G/DL (ref 13.5–17.5)
HOLD SPECIMEN: NORMAL
IMM GRANULOCYTES # BLD AUTO: 0.03 X10*3/UL (ref 0–0.7)
IMM GRANULOCYTES NFR BLD AUTO: 0.4 % (ref 0–0.9)
LYMPHOCYTES # BLD AUTO: 2.42 X10*3/UL (ref 1.2–4.8)
LYMPHOCYTES NFR BLD AUTO: 30.9 %
MAGNESIUM SERPL-MCNC: 1.7 MG/DL (ref 1.6–3.1)
MCH RBC QN AUTO: 28.5 PG (ref 26–34)
MCHC RBC AUTO-ENTMCNC: 32.8 G/DL (ref 32–36)
MCV RBC AUTO: 87 FL (ref 80–100)
MONOCYTES # BLD AUTO: 0.63 X10*3/UL (ref 0.1–1)
MONOCYTES NFR BLD AUTO: 8.1 %
NEUTROPHILS # BLD AUTO: 4.37 X10*3/UL (ref 1.2–7.7)
NEUTROPHILS NFR BLD AUTO: 55.8 %
NRBC BLD-RTO: 0 /100 WBCS (ref 0–0)
P AXIS: 50 DEGREES
P OFFSET: 205 MS
P ONSET: 146 MS
PHOSPHATE SERPL-MCNC: 2.9 MG/DL (ref 2.5–4.5)
PLATELET # BLD AUTO: 224 X10*3/UL (ref 150–450)
POTASSIUM SERPL-SCNC: 3.9 MMOL/L (ref 3.4–5.1)
PR INTERVAL: 146 MS
Q ONSET: 219 MS
QRS COUNT: 18 BEATS
QRS DURATION: 92 MS
QT INTERVAL: 350 MS
QTC CALCULATION(BAZETT): 473 MS
QTC FREDERICIA: 428 MS
R AXIS: 64 DEGREES
RBC # BLD AUTO: 4.74 X10*6/UL (ref 4.5–5.9)
SODIUM SERPL-SCNC: 134 MMOL/L (ref 133–145)
T AXIS: 32 DEGREES
T OFFSET: 394 MS
VENTRICULAR RATE: 110 BPM
WBC # BLD AUTO: 7.8 X10*3/UL (ref 4.4–11.3)

## 2024-05-15 PROCEDURE — 83735 ASSAY OF MAGNESIUM: CPT

## 2024-05-15 PROCEDURE — 36415 COLL VENOUS BLD VENIPUNCTURE: CPT | Performed by: STUDENT IN AN ORGANIZED HEALTH CARE EDUCATION/TRAINING PROGRAM

## 2024-05-15 PROCEDURE — 85730 THROMBOPLASTIN TIME PARTIAL: CPT | Performed by: STUDENT IN AN ORGANIZED HEALTH CARE EDUCATION/TRAINING PROGRAM

## 2024-05-15 PROCEDURE — 73630 X-RAY EXAM OF FOOT: CPT | Mod: RIGHT SIDE | Performed by: RADIOLOGY

## 2024-05-15 PROCEDURE — 93306 TTE W/DOPPLER COMPLETE: CPT

## 2024-05-15 PROCEDURE — 93005 ELECTROCARDIOGRAM TRACING: CPT

## 2024-05-15 PROCEDURE — 84100 ASSAY OF PHOSPHORUS: CPT

## 2024-05-15 PROCEDURE — 2500000004 HC RX 250 GENERAL PHARMACY W/ HCPCS (ALT 636 FOR OP/ED)

## 2024-05-15 PROCEDURE — 73630 X-RAY EXAM OF FOOT: CPT | Mod: RT

## 2024-05-15 PROCEDURE — 82947 ASSAY GLUCOSE BLOOD QUANT: CPT

## 2024-05-15 PROCEDURE — 2020000001 HC ICU ROOM DAILY

## 2024-05-15 PROCEDURE — 2500000002 HC RX 250 W HCPCS SELF ADMINISTERED DRUGS (ALT 637 FOR MEDICARE OP, ALT 636 FOR OP/ED)

## 2024-05-15 PROCEDURE — 87185 SC STD ENZYME DETCJ PER NZM: CPT | Mod: WESLAB | Performed by: STUDENT IN AN ORGANIZED HEALTH CARE EDUCATION/TRAINING PROGRAM

## 2024-05-15 PROCEDURE — 82374 ASSAY BLOOD CARBON DIOXIDE: CPT

## 2024-05-15 PROCEDURE — 2500000001 HC RX 250 WO HCPCS SELF ADMINISTERED DRUGS (ALT 637 FOR MEDICARE OP): Performed by: INTERNAL MEDICINE

## 2024-05-15 PROCEDURE — 2500000005 HC RX 250 GENERAL PHARMACY W/O HCPCS: Performed by: INTERNAL MEDICINE

## 2024-05-15 PROCEDURE — 2500000004 HC RX 250 GENERAL PHARMACY W/ HCPCS (ALT 636 FOR OP/ED): Performed by: INTERNAL MEDICINE

## 2024-05-15 PROCEDURE — 99291 CRITICAL CARE FIRST HOUR: CPT | Performed by: INTERNAL MEDICINE

## 2024-05-15 PROCEDURE — 85025 COMPLETE CBC W/AUTO DIFF WBC: CPT

## 2024-05-15 PROCEDURE — 36415 COLL VENOUS BLD VENIPUNCTURE: CPT

## 2024-05-15 RX ORDER — AMMONIUM LACTATE 12 G/100G
LOTION TOPICAL
Status: DISCONTINUED | OUTPATIENT
Start: 2024-05-15 | End: 2024-05-21 | Stop reason: HOSPADM

## 2024-05-15 RX ORDER — FUROSEMIDE 40 MG/1
40 TABLET ORAL DAILY
Status: DISCONTINUED | OUTPATIENT
Start: 2024-05-15 | End: 2024-05-21

## 2024-05-15 RX ORDER — VALSARTAN 40 MG/1
40 TABLET ORAL DAILY
COMMUNITY

## 2024-05-15 RX ORDER — CARVEDILOL 12.5 MG/1
12.5 TABLET ORAL 2 TIMES DAILY PRN
Status: ON HOLD | COMMUNITY
End: 2024-05-21

## 2024-05-15 RX ORDER — INSULIN GLARGINE 100 [IU]/ML
40 INJECTION, SOLUTION SUBCUTANEOUS NIGHTLY
Status: DISCONTINUED | OUTPATIENT
Start: 2024-05-15 | End: 2024-05-16

## 2024-05-15 RX ORDER — MAGNESIUM SULFATE HEPTAHYDRATE 40 MG/ML
2 INJECTION, SOLUTION INTRAVENOUS ONCE
Status: COMPLETED | OUTPATIENT
Start: 2024-05-15 | End: 2024-05-15

## 2024-05-15 RX ADMIN — Medication 4 L/MIN: at 08:00

## 2024-05-15 RX ADMIN — HEPARIN SODIUM 2300 UNITS/HR: 10000 INJECTION, SOLUTION INTRAVENOUS at 01:25

## 2024-05-15 RX ADMIN — INSULIN HUMAN 3 UNITS: 100 INJECTION, SOLUTION PARENTERAL at 07:56

## 2024-05-15 RX ADMIN — Medication 4 L/MIN: at 20:00

## 2024-05-15 RX ADMIN — HEPARIN SODIUM 2300 UNITS/HR: 10000 INJECTION, SOLUTION INTRAVENOUS at 23:19

## 2024-05-15 RX ADMIN — MAGNESIUM SULFATE HEPTAHYDRATE 2 G: 40 INJECTION, SOLUTION INTRAVENOUS at 07:09

## 2024-05-15 RX ADMIN — PERFLUTREN 1 ML OF DILUTION: 6.52 INJECTION, SUSPENSION INTRAVENOUS at 10:09

## 2024-05-15 RX ADMIN — INSULIN HUMAN 3 UNITS: 100 INJECTION, SOLUTION PARENTERAL at 04:25

## 2024-05-15 RX ADMIN — INSULIN HUMAN 4 UNITS: 100 INJECTION, SOLUTION PARENTERAL at 00:33

## 2024-05-15 RX ADMIN — INSULIN GLARGINE 40 UNITS: 100 INJECTION, SOLUTION SUBCUTANEOUS at 21:01

## 2024-05-15 RX ADMIN — FUROSEMIDE 40 MG: 40 TABLET ORAL at 18:14

## 2024-05-15 RX ADMIN — HEPARIN SODIUM 2300 UNITS/HR: 10000 INJECTION, SOLUTION INTRAVENOUS at 11:16

## 2024-05-15 SDOH — SOCIAL STABILITY: SOCIAL NETWORK: HOW OFTEN DO YOU GET TOGETHER WITH FRIENDS OR RELATIVES?: ONCE A WEEK

## 2024-05-15 SDOH — SOCIAL STABILITY: SOCIAL NETWORK
DO YOU BELONG TO ANY CLUBS OR ORGANIZATIONS SUCH AS CHURCH GROUPS UNIONS, FRATERNAL OR ATHLETIC GROUPS, OR SCHOOL GROUPS?: NO

## 2024-05-15 SDOH — SOCIAL STABILITY: SOCIAL INSECURITY
WITHIN THE LAST YEAR, HAVE TO BEEN RAPED OR FORCED TO HAVE ANY KIND OF SEXUAL ACTIVITY BY YOUR PARTNER OR EX-PARTNER?: NO

## 2024-05-15 SDOH — SOCIAL STABILITY: SOCIAL NETWORK: HOW OFTEN DO YOU ATTEND CHURCH OR RELIGIOUS SERVICES?: NEVER

## 2024-05-15 SDOH — ECONOMIC STABILITY: INCOME INSECURITY: IN THE LAST 12 MONTHS, WAS THERE A TIME WHEN YOU WERE NOT ABLE TO PAY THE MORTGAGE OR RENT ON TIME?: NO

## 2024-05-15 SDOH — ECONOMIC STABILITY: INCOME INSECURITY: HOW HARD IS IT FOR YOU TO PAY FOR THE VERY BASICS LIKE FOOD, HOUSING, MEDICAL CARE, AND HEATING?: NOT VERY HARD

## 2024-05-15 SDOH — SOCIAL STABILITY: SOCIAL NETWORK: ARE YOU MARRIED, WIDOWED, DIVORCED, SEPARATED, NEVER MARRIED, OR LIVING WITH A PARTNER?: LIVING WITH PARTNER

## 2024-05-15 SDOH — SOCIAL STABILITY: SOCIAL INSECURITY: WITHIN THE LAST YEAR, HAVE YOU BEEN HUMILIATED OR EMOTIONALLY ABUSED IN OTHER WAYS BY YOUR PARTNER OR EX-PARTNER?: NO

## 2024-05-15 SDOH — ECONOMIC STABILITY: HOUSING INSECURITY
IN THE LAST 12 MONTHS, WAS THERE A TIME WHEN YOU DID NOT HAVE A STEADY PLACE TO SLEEP OR SLEPT IN A SHELTER (INCLUDING NOW)?: NO

## 2024-05-15 SDOH — HEALTH STABILITY: MENTAL HEALTH
HOW OFTEN DO YOU NEED TO HAVE SOMEONE HELP YOU WHEN YOU READ INSTRUCTIONS, PAMPHLETS, OR OTHER WRITTEN MATERIAL FROM YOUR DOCTOR OR PHARMACY?: NEVER

## 2024-05-15 SDOH — HEALTH STABILITY: PHYSICAL HEALTH: ON AVERAGE, HOW MANY DAYS PER WEEK DO YOU ENGAGE IN MODERATE TO STRENUOUS EXERCISE (LIKE A BRISK WALK)?: 0 DAYS

## 2024-05-15 SDOH — ECONOMIC STABILITY: TRANSPORTATION INSECURITY
IN THE PAST 12 MONTHS, HAS THE LACK OF TRANSPORTATION KEPT YOU FROM MEDICAL APPOINTMENTS OR FROM GETTING MEDICATIONS?: NO

## 2024-05-15 SDOH — ECONOMIC STABILITY: TRANSPORTATION INSECURITY
IN THE PAST 12 MONTHS, HAS LACK OF TRANSPORTATION KEPT YOU FROM MEETINGS, WORK, OR FROM GETTING THINGS NEEDED FOR DAILY LIVING?: NO

## 2024-05-15 SDOH — HEALTH STABILITY: MENTAL HEALTH: HOW MANY STANDARD DRINKS CONTAINING ALCOHOL DO YOU HAVE ON A TYPICAL DAY?: PATIENT DOES NOT DRINK

## 2024-05-15 SDOH — ECONOMIC STABILITY: HOUSING INSECURITY: IN THE LAST 12 MONTHS, HOW MANY PLACES HAVE YOU LIVED?: 1

## 2024-05-15 SDOH — HEALTH STABILITY: MENTAL HEALTH: HOW OFTEN DO YOU HAVE A DRINK CONTAINING ALCOHOL?: NEVER

## 2024-05-15 SDOH — SOCIAL STABILITY: SOCIAL INSECURITY
WITHIN THE LAST YEAR, HAVE YOU BEEN KICKED, HIT, SLAPPED, OR OTHERWISE PHYSICALLY HURT BY YOUR PARTNER OR EX-PARTNER?: NO

## 2024-05-15 SDOH — HEALTH STABILITY: MENTAL HEALTH: HOW OFTEN DO YOU HAVE 6 OR MORE DRINKS ON ONE OCCASION?: NEVER

## 2024-05-15 SDOH — SOCIAL STABILITY: SOCIAL INSECURITY: WITHIN THE LAST YEAR, HAVE YOU BEEN AFRAID OF YOUR PARTNER OR EX-PARTNER?: NO

## 2024-05-15 SDOH — ECONOMIC STABILITY: FOOD INSECURITY: WITHIN THE PAST 12 MONTHS, THE FOOD YOU BOUGHT JUST DIDN'T LAST AND YOU DIDN'T HAVE MONEY TO GET MORE.: NEVER TRUE

## 2024-05-15 SDOH — SOCIAL STABILITY: SOCIAL NETWORK: HOW OFTEN DO YOU ATTENT MEETINGS OF THE CLUB OR ORGANIZATION YOU BELONG TO?: NEVER

## 2024-05-15 SDOH — ECONOMIC STABILITY: INCOME INSECURITY: IN THE PAST 12 MONTHS, HAS THE ELECTRIC, GAS, OIL, OR WATER COMPANY THREATENED TO SHUT OFF SERVICE IN YOUR HOME?: NO

## 2024-05-15 SDOH — HEALTH STABILITY: PHYSICAL HEALTH: ON AVERAGE, HOW MANY MINUTES DO YOU ENGAGE IN EXERCISE AT THIS LEVEL?: 0 MIN

## 2024-05-15 SDOH — HEALTH STABILITY: MENTAL HEALTH
STRESS IS WHEN SOMEONE FEELS TENSE, NERVOUS, ANXIOUS, OR CAN'T SLEEP AT NIGHT BECAUSE THEIR MIND IS TROUBLED. HOW STRESSED ARE YOU?: NOT AT ALL

## 2024-05-15 SDOH — SOCIAL STABILITY: SOCIAL NETWORK
IN A TYPICAL WEEK, HOW MANY TIMES DO YOU TALK ON THE PHONE WITH FAMILY, FRIENDS, OR NEIGHBORS?: MORE THAN THREE TIMES A WEEK

## 2024-05-15 ASSESSMENT — ENCOUNTER SYMPTOMS
NAUSEA: 0
VOMITING: 0
FLANK PAIN: 0
DIARRHEA: 0
DYSURIA: 0
MYALGIAS: 0
SORE THROAT: 0
PALPITATIONS: 1
BACK PAIN: 0
ABDOMINAL PAIN: 0
WOUND: 1
DIZZINESS: 1
CONFUSION: 0
SINUS PRESSURE: 0
SINUS PAIN: 0
SEIZURES: 0
NERVOUS/ANXIOUS: 0
LIGHT-HEADEDNESS: 1
CONSTIPATION: 0
HEMATURIA: 0
RHINORRHEA: 0
NECK PAIN: 0
DYSPHORIC MOOD: 0
ARTHRALGIAS: 0
HEADACHES: 0

## 2024-05-15 ASSESSMENT — PAIN - FUNCTIONAL ASSESSMENT
PAIN_FUNCTIONAL_ASSESSMENT: 0-10

## 2024-05-15 ASSESSMENT — PAIN SCALES - GENERAL
PAINLEVEL_OUTOF10: 0 - NO PAIN

## 2024-05-15 ASSESSMENT — ACTIVITIES OF DAILY LIVING (ADL): LACK_OF_TRANSPORTATION: NO

## 2024-05-15 ASSESSMENT — LIFESTYLE VARIABLES
SKIP TO QUESTIONS 9-10: 1
AUDIT-C TOTAL SCORE: 0

## 2024-05-15 NOTE — PROGRESS NOTES
Mina Huitron is a 53 y.o. male on day 1 of admission presenting with Acute pulmonary embolism, unspecified pulmonary embolism type, unspecified whether acute cor pulmonale present (Multi).  Patient with VTE, HTN, DLP, DM with neuropathy and diabetic foot, chronic anemia, obesity, who presented with SOB x few days. In the ED work up revealed b/l PE. PERT team was consulted who recommended heparin drip and ICU monitoring. Subsequently is being admitted to ICU.   Subjective   No acute overnight events. Received Lasix with good urine output and improvement of his BP.     Overall is feeling better. SOB improved. Feels slightly congested. Denies palpitation or LH. No pains or any other complaints.   Objective   Scheduled medications  insulin glargine, 20 Units, subcutaneous, Nightly  insulin regular, 0-5 Units, subcutaneous, q4h  magnesium sulfate, 2 g, intravenous, Once  oxygen, , inhalation, Continuous - Inhalation    Continuous medications  heparin, 0-4,500 Units/hr, Last Rate: 2,300 Units/hr (05/15/24 0603)    PRN medications  PRN medications: dextrose, dextrose, glucagon, glucagon, heparin (porcine)   Physical Exam  Constitutional:       General: He is not in acute distress.     Appearance: He is obese. He is not ill-appearing or toxic-appearing.   HENT:      Head: Normocephalic and atraumatic.      Nose:      Comments: On 4L NC     Mouth/Throat:      Mouth: Mucous membranes are moist.      Comments: Mallampati 3.   Eyes:      General: No scleral icterus.     Extraocular Movements: Extraocular movements intact.      Pupils: Pupils are equal, round, and reactive to light.   Cardiovascular:      Rate and Rhythm: Normal rate and regular rhythm.      Heart sounds: No murmur heard.     No friction rub. No gallop.   Pulmonary:      Effort: Pulmonary effort is normal. No respiratory distress.      Breath sounds: Normal breath sounds. No wheezing or rales.   Abdominal:      General: Abdomen is flat. There is no  "distension.      Palpations: Abdomen is soft.      Tenderness: There is no abdominal tenderness.   Musculoskeletal:      Cervical back: Normal range of motion and neck supple. No rigidity or tenderness.      Right lower leg: Edema (2+) present.      Left lower leg: Edema (2+) present.   Lymphadenopathy:      Cervical: No cervical adenopathy.   Skin:     General: Skin is warm and dry.      Coloration: Skin is not jaundiced.      Findings: Rash (chronic discoloration b/l LE) present.   Neurological:      General: No focal deficit present.      Mental Status: He is alert and oriented to person, place, and time.      Cranial Nerves: No cranial nerve deficit.      Motor: No weakness.   Psychiatric:         Mood and Affect: Mood normal.         Behavior: Behavior normal.     Last Recorded Vitals  Blood pressure 151/72, pulse 83, temperature 36.2 °C (97.2 °F), temperature source Temporal, resp. rate 19, height 1.854 m (6' 1\"), weight 138 kg (305 lb 5.4 oz), SpO2 93%.  Intake/Output last 3 Shifts:  I/O last 3 completed shifts:  In: 339.5 (2.5 mL/kg) [I.V.:339.5 (2.5 mL/kg)]  Out: 1350 (9.7 mL/kg) [Urine:1350 (0.3 mL/kg/hr)]  Weight: 138.5 kg     Relevant Results  Results for orders placed or performed during the hospital encounter of 05/14/24 (from the past 24 hour(s))   CBC and Auto Differential   Result Value Ref Range    WBC 8.1 4.4 - 11.3 x10*3/uL    nRBC 0.0 0.0 - 0.0 /100 WBCs    RBC 4.72 4.50 - 5.90 x10*6/uL    Hemoglobin 13.7 13.5 - 17.5 g/dL    Hematocrit 40.8 (L) 41.0 - 52.0 %    MCV 86 80 - 100 fL    MCH 29.0 26.0 - 34.0 pg    MCHC 33.6 32.0 - 36.0 g/dL    RDW 13.5 11.5 - 14.5 %    Platelets 221 150 - 450 x10*3/uL    Neutrophils % 73.4 40.0 - 80.0 %    Immature Granulocytes %, Automated 0.4 0.0 - 0.9 %    Lymphocytes % 15.8 13.0 - 44.0 %    Monocytes % 7.1 2.0 - 10.0 %    Eosinophils % 2.9 0.0 - 6.0 %    Basophils % 0.4 0.0 - 2.0 %    Neutrophils Absolute 5.92 1.20 - 7.70 x10*3/uL    Immature Granulocytes " Absolute, Automated 0.03 0.00 - 0.70 x10*3/uL    Lymphocytes Absolute 1.27 1.20 - 4.80 x10*3/uL    Monocytes Absolute 0.57 0.10 - 1.00 x10*3/uL    Eosinophils Absolute 0.23 0.00 - 0.70 x10*3/uL    Basophils Absolute 0.03 0.00 - 0.10 x10*3/uL   Comprehensive metabolic panel   Result Value Ref Range    Glucose 383 (H) 65 - 99 mg/dL    Sodium 136 133 - 145 mmol/L    Potassium 4.2 3.4 - 5.1 mmol/L    Chloride 101 97 - 107 mmol/L    Bicarbonate 21 (L) 24 - 31 mmol/L    Urea Nitrogen 14 8 - 25 mg/dL    Creatinine 1.00 0.40 - 1.60 mg/dL    eGFR 90 >60 mL/min/1.73m*2    Calcium 8.7 8.5 - 10.4 mg/dL    Albumin 3.5 3.5 - 5.0 g/dL    Alkaline Phosphatase 108 35 - 125 U/L    Total Protein 7.1 5.9 - 7.9 g/dL    AST 19 5 - 40 U/L    Bilirubin, Total 0.7 0.1 - 1.2 mg/dL    ALT 16 5 - 40 U/L    Anion Gap 14 <=19 mmol/L   Sars-CoV-2 PCR   Result Value Ref Range    Coronavirus 2019, PCR Not Detected Not Detected   Serial Troponin, Initial ([facility])   Result Value Ref Range    Troponin T, High Sensitivity 33 (HH) <=14 ng/L   NT Pro-BNP   Result Value Ref Range    PROBNP 459 (H) 0 - 138 pg/mL   ECG 12 lead   Result Value Ref Range    Ventricular Rate 110 BPM    Atrial Rate 110 BPM    WV Interval 146 ms    QRS Duration 92 ms    QT Interval 350 ms    QTC Calculation(Bazett) 473 ms    P Axis 50 degrees    R Axis 64 degrees    T Axis 32 degrees    QRS Count 18 beats    Q Onset 219 ms    P Onset 146 ms    P Offset 205 ms    T Offset 394 ms    QTC Fredericia 428 ms   Serial Troponin, 2 Hour ([facility])   Result Value Ref Range    Troponin T, High Sensitivity 63 (HH) <=14 ng/L   aPTT   Result Value Ref Range    aPTT 27.1 22.0 - 32.5 seconds   CBC   Result Value Ref Range    WBC 8.0 4.4 - 11.3 x10*3/uL    nRBC 0.0 0.0 - 0.0 /100 WBCs    RBC 4.85 4.50 - 5.90 x10*6/uL    Hemoglobin 14.1 13.5 - 17.5 g/dL    Hematocrit 42.2 41.0 - 52.0 %    MCV 87 80 - 100 fL    MCH 29.1 26.0 - 34.0 pg    MCHC 33.4 32.0 - 36.0 g/dL    RDW 13.9 11.5 - 14.5 %    Platelets  238 150 - 450 x10*3/uL   Serial Troponin, 6 Hour ([facility])   Result Value Ref Range    Troponin T, High Sensitivity 67 (HH) <=14 ng/L   aPTT   Result Value Ref Range    aPTT 39.0 (H) 22.0 - 32.5 seconds   Hemoglobin A1C   Result Value Ref Range    Hemoglobin A1C 10.7 (H) See below %    Estimated Average Glucose 260 Not Established mg/dL   POCT GLUCOSE   Result Value Ref Range    POCT Glucose 282 (H) 74 - 99 mg/dL   POCT GLUCOSE   Result Value Ref Range    POCT Glucose 343 (H) 74 - 99 mg/dL   POCT GLUCOSE   Result Value Ref Range    POCT Glucose 329 (H) 74 - 99 mg/dL   aPTT   Result Value Ref Range    aPTT 55.3 (H) 22.0 - 32.5 seconds   POCT GLUCOSE   Result Value Ref Range    POCT Glucose 281 (H) 74 - 99 mg/dL   CBC and Auto Differential   Result Value Ref Range    WBC 7.8 4.4 - 11.3 x10*3/uL    nRBC 0.0 0.0 - 0.0 /100 WBCs    RBC 4.74 4.50 - 5.90 x10*6/uL    Hemoglobin 13.5 13.5 - 17.5 g/dL    Hematocrit 41.2 41.0 - 52.0 %    MCV 87 80 - 100 fL    MCH 28.5 26.0 - 34.0 pg    MCHC 32.8 32.0 - 36.0 g/dL    RDW 13.9 11.5 - 14.5 %    Platelets 224 150 - 450 x10*3/uL    Neutrophils % 55.8 40.0 - 80.0 %    Immature Granulocytes %, Automated 0.4 0.0 - 0.9 %    Lymphocytes % 30.9 13.0 - 44.0 %    Monocytes % 8.1 2.0 - 10.0 %    Eosinophils % 4.0 0.0 - 6.0 %    Basophils % 0.8 0.0 - 2.0 %    Neutrophils Absolute 4.37 1.20 - 7.70 x10*3/uL    Immature Granulocytes Absolute, Automated 0.03 0.00 - 0.70 x10*3/uL    Lymphocytes Absolute 2.42 1.20 - 4.80 x10*3/uL    Monocytes Absolute 0.63 0.10 - 1.00 x10*3/uL    Eosinophils Absolute 0.31 0.00 - 0.70 x10*3/uL    Basophils Absolute 0.06 0.00 - 0.10 x10*3/uL   Basic Metabolic Panel   Result Value Ref Range    Glucose 320 (H) 65 - 99 mg/dL    Sodium 134 133 - 145 mmol/L    Potassium 3.9 3.4 - 5.1 mmol/L    Chloride 99 97 - 107 mmol/L    Bicarbonate 24 24 - 31 mmol/L    Urea Nitrogen 15 8 - 25 mg/dL    Creatinine 0.80 0.40 - 1.60 mg/dL    eGFR >90 >60 mL/min/1.73m*2    Calcium 8.7 8.5 -  10.4 mg/dL    Anion Gap 11 <=19 mmol/L   Magnesium   Result Value Ref Range    Magnesium 1.70 1.60 - 3.10 mg/dL   Phosphorus   Result Value Ref Range    Phosphorus 2.9 2.5 - 4.5 mg/dL   Light Blue Top   Result Value Ref Range    Extra Tube Hold for add-ons.    POCT GLUCOSE   Result Value Ref Range    POCT Glucose 273 (H) 74 - 99 mg/dL   Vascular US lower extremity venous duplex bilateral    Result Date: 5/14/2024  Interpreted By:  Phil Tejeda, STUDY: VASC US LOWER EXTREMITY VENOUS DUPLEX BILATERAL  5/14/2024 5:10 pm   INDICATION: 54 y/o   M with  Signs/Symptoms:bilateral PEs. LMP:  Unknown.   COMPARISON: None.   ACCESSION NUMBER(S): [id number]   ORDERING CLINICIAN: NICKOLAS BOYKIN   TECHNIQUE: Routine ultrasound of the  bilateral lower extremities was performed with duplex Doppler (color and spectral) evaluation.   Static images were obtained for remote interpretation.     FINDINGS: Right lower extremity: All visualized deep veins in the right lower extremity are patent with no thrombosis. The veins are compressible and demonstrate normal augmentation.   Left lower extremity: The junction of the greater saphenous and common femoral veins is not compressible, which may be due to nonocclusive thrombosis. All other deep veins in the femoropopliteal segment of the left lower extremity are patent and compressible with no evidence of thrombosis. The left calf veins could not be identified.       Questionable nonocclusive thrombosis at the junction of the left greater saphenous vein and common femoral vein.   No DVT in the right lower extremity.   MACRO: None   Signed by: Phil Tejeda 5/14/2024 5:49 PM Dictation workstation:   [id number]    CT angio chest for pulmonary embolism    Result Date: 5/14/2024  Interpreted By:  Cameron Navarro, STUDY: CT ANGIO CHEST FOR PULMONARY EMBOLISM; 5/14/2024 11:16 am   INDICATION: Signs/Symptoms:short of breath, tachycardic, history of PE.   COMPARISON: None   ACCESSION  NUMBER(S): [id number]   ORDERING CLINICIAN: NICKOLAS BOYKIN   TECHNIQUE: Contiguous axial images of the thorax were obtained from the level of the thoracic inlet through the lung bases. 3-D MIPS were created, processed and reviewed on a separate workstation. 100 ml of Omnipaque 350 was utilized. All CT examinations are performed with 1 or more of the following dose reduction techniques: Automated exposure control, adjustment of mA and/or kv according to patient's size, or use of iterative reconstruction techniques.   FINDINGS: The thyroid gland is unremarkable.   There is adequate contrast opacification of the pulmonary arterial vasculature. Extensive filling defects consistent with pulmonary emboli are seen bilaterally. There is prominent pulmonary embolus at the distal main right and distal main left pulmonary emboli with extensive thrombus extending throughout the majority of segmental and subsegmental branches bilaterally. However, no evidence for right heart strain or significant septal deviation.   The heart size is within normal limits.  No pericardial effusion is identified. The aorta and pulmonary arteries are normal in caliber.   There are no pathologically enlarged mediastinal, hilar, or axillary lymph nodes are seen.   The trachea and mainstem bronchi are patent. No suspicious pulmonary nodules are seen. No significant consolidation. There is no evidence of pneumothorax or pleural effusion.   The visualized osseous structures are intact.   Limited images through the upper abdomen are unremarkable.       1. (+) PE.  Extensive bilateral pulmonary emboli. No evidence of right heart strain. 2. No significant airspace opacity or consolidation.     Signed by: Cameron Navarro 5/14/2024 11:49 AM Dictation workstation:   [id number]    ECG 12 lead    Result Date: 5/14/2024  Sinus tachycardia Otherwise normal ECG No previous ECGs available    Assessment/Plan   Principal Problem:    Acute pulmonary embolism,  unspecified pulmonary embolism type, unspecified whether acute cor pulmonale present (Multi)  53 YOM with VTE, HTN, DLP, DM with neuropathy and diabetic foot, chronic anemia, obesity, who presented with SOB x few days. In the ED work up revealed b/l PE. PERT team was consulted who recommended heparin drip and ICU monitoring. Subsequently is being admitted to ICU.      Neuro: no acute issues, h/o diabetic neuropathy. Not on any pain medication.      Supportive measures      Pain management as needed     CV: HTN, DLP. Presented with tachycardia from PE, now resolved. BP slightly elevated but acceptable range      Hold home BP medications      Continue diuresis       Continue to monitor     Pulmonary: h/o VTE, now with acute PE c/b acute hypoxic respiratory failure. With significant clot burden consistent with submassive PE (b/l PE, elevated BNP, troponin, +ve LE DVT, tachycardia, and hypoxia)      PERT team contacted, not an candidate for thrombectomy at this time      continue heparin drip      Supplemental O2, wean off as tolerates      Will need lifelong anticoagulation      2D echo done, read is pending.      : no acute issues      Is/Os      Daily RFP      Treat electrolytes abnormalities as indicated     GI: no acute issues      Started on a diabetic diet        Endo: DM with elevated BS      Will continue Lantus, dose increased to 40 U      SSI      Hypoglycemic protocol     Hem/Onc: h/o chronic anemia, Hb WNL      Continue to monitor with daily CBC     ID: no findings suggestive of infection      Continue to monitor for S&S infection     This was a critical care visit for pulmonary embolism with hypoxic respiratory failure. Total time 50 min.    Jon Carney MD

## 2024-05-15 NOTE — PROGRESS NOTES
05/15/24 1259   Physical Activity   On average, how many days per week do you engage in moderate to strenuous exercise (like a brisk walk)? 0 days   On average, how many minutes do you engage in exercise at this level? 0 min   Financial Resource Strain   How hard is it for you to pay for the very basics like food, housing, medical care, and heating? Not very   Housing Stability   In the last 12 months, was there a time when you were not able to pay the mortgage or rent on time? N   In the last 12 months, how many places have you lived? 1   In the last 12 months, was there a time when you did not have a steady place to sleep or slept in a shelter (including now)? N   Transportation Needs   In the past 12 months, has lack of transportation kept you from medical appointments or from getting medications? no   In the past 12 months, has lack of transportation kept you from meetings, work, or from getting things needed for daily living? No   Food Insecurity   Within the past 12 months, the food you bought just didn't last and you didn't have money to get more. Never true   Stress   Do you feel stress - tense, restless, nervous, or anxious, or unable to sleep at night because your mind is troubled all the time - these days? Not at all   Social Connections   In a typical week, how many times do you talk on the phone with family, friends, or neighbors? More than 3   How often do you get together with friends or relatives? Once   How often do you attend Mormon or Confucianist services? Never   Do you belong to any clubs or organizations such as Mormon groups, unions, fraternal or athletic groups, or school groups? No   How often do you attend meetings of the clubs or organizations you belong to? Never   Are you , , , , never , or living with a partner? Living with   Intimate Partner Violence   Within the last year, have you been afraid of your partner or ex-partner? No   Within the last  year, have you been humiliated or emotionally abused in other ways by your partner or ex-partner? No   Within the last year, have you been kicked, hit, slapped, or otherwise physically hurt by your partner or ex-partner? No   Within the last year, have you been raped or forced to have any kind of sexual activity by your partner or ex-partner? No   Alcohol Use   Q1: How often do you have a drink containing alcohol? Never   Q2: How many drinks containing alcohol do you have on a typical day when you are drinking? None   Q3: How often do you have six or more drinks on one occasion? Never   Utilities   In the past 12 months has the electric, gas, oil, or water company threatened to shut off services in your home? No   Health Literacy   How often do you need to have someone help you when you read instructions, pamphlets, or other written material from your doctor or pharmacy? Never

## 2024-05-15 NOTE — CARE PLAN
Problem: Pain  Goal: Takes deep breaths with improved pain control throughout the shift  5/14/2024 2352 by Sammie Peñaloza RN  Outcome: Not Progressing  5/14/2024 2351 by Sammie Peñaloza RN  Outcome: Progressing   The patient's goals for the shift include      The clinical goals for the shift include no bleeding or respiratory distress  Problem: Pain  Goal: Takes deep breaths with improved pain control throughout the shift  5/14/2024 2352 by Sammie Peñaloza RN  Outcome: Not Progressing  5/14/2024 2351 by Sammie Peñaloza RN  Outcome: Progressing

## 2024-05-15 NOTE — CONSULTS
Wound Care Consult     Visit Date: 5/15/2024      Patient Name: Mina Huitron         MRN: [id number]           YOB: 1970     Reason for Consult: Right plantar wound        Wound History: Present on admission. Patient with Chronic Diabetic ulcer to Right med plantar.      A 53 y.o. year old male admitted for Principal Problem:    Acute pulmonary embolism, unspecified pulmonary embolism type, unspecified whether acute cor pulmonale present (Multi)      History reviewed. No pertinent past medical history.   History reviewed. No pertinent surgical history.    Scheduled medications  insulin glargine, 40 Units, subcutaneous, Nightly  insulin regular, 0-5 Units, subcutaneous, With meals & nightly  oxygen, , inhalation, Continuous - Inhalation      Continuous medications  heparin, 0-4,500 Units/hr, Last Rate: 2,300 Units/hr (05/15/24 1116)      PRN medications  PRN medications: dextrose, dextrose, glucagon, glucagon, heparin (porcine)    No Known Allergies    Pertinent Labs:   Albumin   Date Value Ref Range Status   05/14/2024 3.5 3.5 - 5.0 g/dL Final       Wound Assessment:  Wound 05/14/24 Diabetic Ulcer Foot Right (Active)   Wound Image   05/14/24 2111   Site Assessment Red 05/15/24 1500   Yvette-Wound Assessment Calloused;Hypopigmented;Maceration 05/15/24 1500   Non-staged Wound Description Full thickness 05/15/24 1500   Shape oval 05/15/24 0800   Wound Length (cm) 3 cm 05/15/24 1500   Wound Width (cm) 1.5 cm 05/15/24 1500   Wound Surface Area (cm^2) 4.5 cm^2 05/15/24 1500   Wound Depth (cm) 0.4 cm 05/15/24 1500   Wound Volume (cm^3) 1.8 cm^3 05/15/24 1500   Wound Healing % 94 05/15/24 1500   Undermining 0.8 cm 05/15/24 1500   Undermining Clock Position of Wound 6 05/15/24 1500   Margins Well-defined edges;Not attached 05/15/24 1500   Drainage Description Serous 05/15/24 1500   Drainage Amount Small 05/15/24 1500   Dressing ABD;Kerlix/rolled gauze 05/15/24 1500   Dressing Changed Changed 05/15/24 1500    Dressing Status Clean;Dry 05/15/24 1500       Wound Team Summary Assessment:   Exam conducted on day 1 of stay with knowledge of Floor Nurse. Introductions made to patient, alert and oriented. On exam patient lying in bed. Right foot wrapped with kerlix. Vascular discoloration and dry plaque to BLE. Dressing removed. Patient with slow healing diabetic ulcer to right plantar. Calloused around edges, hypopigmented. Podiatry consult placed for assess and possible debridement. Patient has been going outpatient but has not seen Podiatrist in several months. Woundcare done and measurements taken. Wound cleansed with soap and water, betadine, abd pad and kerlix applied. No other skin issues or concerns. Will see about ammonium lactate lotion for legs. Patient is on a [employer] bedsystem. Bere Whitmore RN updated, to continue pressure injury prevention interventions, Woundcare, and nursing to continue to follow providers orders. Reconsult Wound RN PRN. Nena DIA RN          Wound Team Plan: Continue to follow Provider orders. Rt Plantar- cleanse with soap and water, apply betadine gauze and DCD daily and prn. Continue to offload      Nena Peterson, SHADIA  5/15/2024  3:09 PM

## 2024-05-15 NOTE — CARE PLAN
The patient's goals for the shift include  no bleeding or respiratory distress    The clinical goals for the shift include no bleeding or respiratory disrtress

## 2024-05-15 NOTE — PROGRESS NOTES
05/15/24 1306   [facility] Disability Status   Are you deaf or do you have serious difficulty hearing? N   Are you blind or do you have serious difficulty seeing, even when wearing glasses? N   Because of a physical, mental, or emotional condition, do you have serious difficulty concentrating, remembering, or making decisions? (5 years old or older) N   Do you have serious difficulty walking or climbing stairs? N   Do you have serious difficulty dressing or bathing? N   Because of a physical, mental, or emotional condition, do you have serious difficulty doing errands alone such as visiting the doctor? N

## 2024-05-15 NOTE — CONSULTS
Inpatient consult to Podiatry  Consult performed by: Mina Fischer DPM  Consult ordered by: Jon Carney MD  Reason for consult: Chronic right plantar foot ulceration          History Of Present Illness  Mina Huitron is a 53 y.o. 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  Workup revealed bilateral pulmonary embolisms.  Was started on heparin drip and admitted to ICU.    I was consulted to evaluate and manage his chronic right foot plantar ulceration.  This has been present for 15 years per the patient's report.  He follows with Dr. Stone for podiatric care.  Has had bouts of infection to the area which has been treated with antibiotics in the past..  States at 1 point the bone was infected.  He does not believe that this is the case currently.  States he has history of Charcot neuroarthropathy with midfoot breakdown which is what started the ulcer formation.  When questioned about the chronicity of his wound he states that he does not have health insurance and has not prioritized his health.  Currently denies Fevers chills nausea or vomiting.      Past Medical History  He has no past medical history on file.    Surgical History  He has no past surgical history on file.     Social History  He reports that he has never smoked. He has never been exposed to tobacco smoke. He has never used smokeless tobacco. He reports that he does not drink alcohol and does not use drugs.    Family History  No family history on file.     Allergies  Patient has no known allergies.    Review of Systems    REVIEW OF SYSTEMS  GENERAL:  Negative for malaise, significant weight loss, fever  HEENT:  No changes in hearing or vision,  no nose bleeds or other nasal problems and Negative for frequent or significant headaches  NECK:  Negative for lumps, goiter, pain and significant neck swelling  RESPIRATORY:  Negative for cough, wheezing and shortness of breath  CARDIOVASCULAR: Chronic leg swelling  GI:  Negative for abdominal discomfort, blood in stools or black stools and change in bowel habits  :  Negative for dysuria, frequency and incontinence  MUSCULOSKELETAL:  Negative for joint pain or swelling, back pain, and muscle pain.  SKIN: Chronic ulcer right foot plantar   PSYCH:  Negative for sleep disturbance, mood disorder and recent psychosocial stressors  HEMATOLOGY/LYMPHOLOGY: Positive for prolonged bleeding, currently anticoagulated   ENDOCRINE:  Negative for cold or heat intolerance, polyuria, polydipsia and goiter  NEURO: Numbness to bottom of feet secondary to neuropathy    Objective: Patient seen bedside.  ICU bed 6.  Aware alert and oriented x 3.  Does not appear to be in distress.      Vasc: DP and PT pulses are faintly palpable bilateral.  Remarkable +2 pitting edema to bilateral lower extremities.  There is evidence of chronic hemosiderin deposition secondary to venous insufficiency.  Skin temperature is warm to cool proximal to distal bilateral.      Neuro:  Light touch is intact to the foot bilateral.  Protective sensation is completely absent to the foot when tested with the 5.07 SWM bilateral.      Derm: Right foot plantar central midfoot with large roughly 3.0 cm x 1.6 cm x 0.6 cm ulceration.  Fibrogranular base.  Significant hyperkeratotic buildup.  Malodor.  There is some discernible depth and undermining as well as probing although not to the level of bone.  No purulent drainage.  No abscess formation.    Ortho: Charcot neuroarthropathy with midfoot breakdown and rocker-bottom foot deformity to the right foot.  Loss of fourth digit right foot.       Physical Exam     Last Recorded Vitals  Blood pressure 149/81, pulse 79,  "temperature 36.2 °C (97.2 °F), temperature source Temporal, resp. rate 25, height 1.854 m (6' 1\"), weight 138 kg (305 lb 5.4 oz), SpO2 94%.    Relevant Results  Labs reviewed     Assessment/Plan   #chronic diabetic foot ulceration, right foot in the setting of Charcot neuroarthropathy  -Significant hyperkeratotic buildup and fibrotic tissue noted.  This was debrided sharply with a #15 blade with care taken to not debride any more than hyperkeratotic tissue secondary to patient's active anticoagulation status.  Wound was cultured following sharp excisional debridement secondary to remarkable malodor.  Painted with Betadine.  Dry sterile dressing applied.  Will follow wound cultures and will recommend antibiotic therapy and  infectious disease consultation if there is bacterial growth that results.    Will order radiographs of the right foot to assess bony structures and compared to previous radiographs for potential erosive changes that may suggest a chronic osteomyelitis.    Will continue to follow closely.    I spent 60 minutes in the professional and overall care of this patient.      Mina Fischer, FEDE            "

## 2024-05-15 NOTE — NURSING NOTE
Wound Wednesday Head to toe skin assessment completed. Pt has diabetic ulcer to bottom of rt foot near the arch. Pt states he sees podiatry for this. Area wrapped in gauze with kerlix.   No other areas noted. Wound nurse consult placed. Pt able to turn and reposition himself in bed. Donovan score 22. Heels and sacrum protected with Mepilex borders.

## 2024-05-15 NOTE — PROGRESS NOTES
TCC met with patient. Assessment complete. Patient lives with significant other in a condo. Patient is independent. Patient drives and is able to shop, cook and clean. Patient does not have a PCP at this time. Patient does not have insurance, Sumi Swain met with him. Patient fills prescriptions at [facility]. At this time the discharge plan is to return home. Will follow.      05/15/24 1301   Discharge Planning   Living Arrangements Spouse/significant other   Support Systems Spouse/significant other   Type of Residence Private residence   Home or Post Acute Services None   Patient expects to be discharged to: Home   Does the patient need discharge transport arranged? No   Financial Resource Strain   How hard is it for you to pay for the very basics like food, housing, medical care, and heating? Not very   Housing Stability   In the last 12 months, was there a time when you were not able to pay the mortgage or rent on time? N   In the last 12 months, how many places have you lived? 1   In the last 12 months, was there a time when you did not have a steady place to sleep or slept in a shelter (including now)? N   Transportation Needs   In the past 12 months, has lack of transportation kept you from medical appointments or from getting medications? no   In the past 12 months, has lack of transportation kept you from meetings, work, or from getting things needed for daily living? No

## 2024-05-15 NOTE — NURSING NOTE
Pt assisted back to bed  mp-sr   resp even and not labored remains on 4l/nc  lungs cta dim right base.  Denies any c/o

## 2024-05-16 LAB
ANION GAP SERPL CALC-SCNC: 11 MMOL/L
AORTIC VALVE MEAN GRADIENT: 2 MMHG
AORTIC VALVE PEAK VELOCITY: 1.05 M/S
APTT PPP: 49.3 SECONDS (ref 22–32.5)
AV PEAK GRADIENT: 4.4 MMHG
AVA (PEAK VEL): 2.83 CM2
AVA (VTI): 2.67 CM2
BASOPHILS # BLD AUTO: 0.05 X10*3/UL (ref 0–0.1)
BASOPHILS NFR BLD AUTO: 0.7 %
BUN SERPL-MCNC: 17 MG/DL (ref 8–25)
CALCIUM SERPL-MCNC: 8.7 MG/DL (ref 8.5–10.4)
CHLORIDE SERPL-SCNC: 100 MMOL/L (ref 97–107)
CO2 SERPL-SCNC: 25 MMOL/L (ref 24–31)
CREAT SERPL-MCNC: 0.9 MG/DL (ref 0.4–1.6)
EGFRCR SERPLBLD CKD-EPI 2021: >90 ML/MIN/1.73M*2
EJECTION FRACTION APICAL 4 CHAMBER: 60.5
EOSINOPHIL # BLD AUTO: 0.32 X10*3/UL (ref 0–0.7)
EOSINOPHIL NFR BLD AUTO: 4.8 %
ERYTHROCYTE [DISTWIDTH] IN BLOOD BY AUTOMATED COUNT: 14.2 % (ref 11.5–14.5)
GLUCOSE BLD MANUAL STRIP-MCNC: 302 MG/DL (ref 74–99)
GLUCOSE BLD MANUAL STRIP-MCNC: 304 MG/DL (ref 74–99)
GLUCOSE BLD MANUAL STRIP-MCNC: 312 MG/DL (ref 74–99)
GLUCOSE BLD MANUAL STRIP-MCNC: 316 MG/DL (ref 74–99)
GLUCOSE BLD MANUAL STRIP-MCNC: 343 MG/DL (ref 74–99)
GLUCOSE SERPL-MCNC: 322 MG/DL (ref 65–99)
HCT VFR BLD AUTO: 41.2 % (ref 41–52)
HGB BLD-MCNC: 13.3 G/DL (ref 13.5–17.5)
IMM GRANULOCYTES # BLD AUTO: 0.04 X10*3/UL (ref 0–0.7)
IMM GRANULOCYTES NFR BLD AUTO: 0.6 % (ref 0–0.9)
LEFT ATRIUM VOLUME AREA LENGTH INDEX BSA: 24 ML/M2
LEFT VENTRICLE INTERNAL DIMENSION DIASTOLE: 4.67 CM (ref 3.5–6)
LEFT VENTRICULAR OUTFLOW TRACT DIAMETER: 2 CM
LV EJECTION FRACTION BIPLANE: 61 %
LYMPHOCYTES # BLD AUTO: 1.88 X10*3/UL (ref 1.2–4.8)
LYMPHOCYTES NFR BLD AUTO: 28 %
MAGNESIUM SERPL-MCNC: 1.9 MG/DL (ref 1.6–3.1)
MCH RBC QN AUTO: 28.7 PG (ref 26–34)
MCHC RBC AUTO-ENTMCNC: 32.3 G/DL (ref 32–36)
MCV RBC AUTO: 89 FL (ref 80–100)
MITRAL VALVE E/A RATIO: 0.9
MONOCYTES # BLD AUTO: 0.45 X10*3/UL (ref 0.1–1)
MONOCYTES NFR BLD AUTO: 6.7 %
NEUTROPHILS # BLD AUTO: 3.98 X10*3/UL (ref 1.2–7.7)
NEUTROPHILS NFR BLD AUTO: 59.2 %
NRBC BLD-RTO: 0 /100 WBCS (ref 0–0)
PHOSPHATE SERPL-MCNC: 3.1 MG/DL (ref 2.5–4.5)
PLATELET # BLD AUTO: 227 X10*3/UL (ref 150–450)
POTASSIUM SERPL-SCNC: 4.1 MMOL/L (ref 3.4–5.1)
RBC # BLD AUTO: 4.64 X10*6/UL (ref 4.5–5.9)
RIGHT VENTRICLE FREE WALL PEAK S': 8.7 CM/S
SODIUM SERPL-SCNC: 136 MMOL/L (ref 133–145)
TRICUSPID ANNULAR PLANE SYSTOLIC EXCURSION: 2.6 CM
WBC # BLD AUTO: 6.7 X10*3/UL (ref 4.4–11.3)

## 2024-05-16 PROCEDURE — 2500000004 HC RX 250 GENERAL PHARMACY W/ HCPCS (ALT 636 FOR OP/ED): Performed by: INTERNAL MEDICINE

## 2024-05-16 PROCEDURE — 85730 THROMBOPLASTIN TIME PARTIAL: CPT | Performed by: STUDENT IN AN ORGANIZED HEALTH CARE EDUCATION/TRAINING PROGRAM

## 2024-05-16 PROCEDURE — 85025 COMPLETE CBC W/AUTO DIFF WBC: CPT

## 2024-05-16 PROCEDURE — 82947 ASSAY GLUCOSE BLOOD QUANT: CPT

## 2024-05-16 PROCEDURE — 2500000004 HC RX 250 GENERAL PHARMACY W/ HCPCS (ALT 636 FOR OP/ED)

## 2024-05-16 PROCEDURE — 2500000002 HC RX 250 W HCPCS SELF ADMINISTERED DRUGS (ALT 637 FOR MEDICARE OP, ALT 636 FOR OP/ED): Performed by: INTERNAL MEDICINE

## 2024-05-16 PROCEDURE — 2500000001 HC RX 250 WO HCPCS SELF ADMINISTERED DRUGS (ALT 637 FOR MEDICARE OP): Performed by: INTERNAL MEDICINE

## 2024-05-16 PROCEDURE — 2500000005 HC RX 250 GENERAL PHARMACY W/O HCPCS: Performed by: INTERNAL MEDICINE

## 2024-05-16 PROCEDURE — 36415 COLL VENOUS BLD VENIPUNCTURE: CPT | Performed by: STUDENT IN AN ORGANIZED HEALTH CARE EDUCATION/TRAINING PROGRAM

## 2024-05-16 PROCEDURE — 36415 COLL VENOUS BLD VENIPUNCTURE: CPT

## 2024-05-16 PROCEDURE — 2500000002 HC RX 250 W HCPCS SELF ADMINISTERED DRUGS (ALT 637 FOR MEDICARE OP, ALT 636 FOR OP/ED)

## 2024-05-16 PROCEDURE — 84100 ASSAY OF PHOSPHORUS: CPT

## 2024-05-16 PROCEDURE — 2500000006 HC RX 250 W HCPCS SELF ADMINISTERED DRUGS (ALT 637 FOR ALL PAYERS): Performed by: INTERNAL MEDICINE

## 2024-05-16 PROCEDURE — 83735 ASSAY OF MAGNESIUM: CPT

## 2024-05-16 PROCEDURE — 1200000002 HC GENERAL ROOM WITH TELEMETRY DAILY

## 2024-05-16 PROCEDURE — 80048 BASIC METABOLIC PNL TOTAL CA: CPT

## 2024-05-16 PROCEDURE — 99233 SBSQ HOSP IP/OBS HIGH 50: CPT | Performed by: INTERNAL MEDICINE

## 2024-05-16 RX ORDER — INSULIN GLARGINE 100 [IU]/ML
60 INJECTION, SOLUTION SUBCUTANEOUS NIGHTLY
Status: DISCONTINUED | OUTPATIENT
Start: 2024-05-16 | End: 2024-05-18

## 2024-05-16 RX ORDER — MAGNESIUM SULFATE 1 G/100ML
1 INJECTION INTRAVENOUS ONCE
Status: COMPLETED | OUTPATIENT
Start: 2024-05-16 | End: 2024-05-16

## 2024-05-16 RX ORDER — CARVEDILOL 12.5 MG/1
12.5 TABLET ORAL 2 TIMES DAILY PRN
Status: DISCONTINUED | OUTPATIENT
Start: 2024-05-16 | End: 2024-05-21 | Stop reason: HOSPADM

## 2024-05-16 RX ORDER — VALSARTAN 80 MG/1
40 TABLET ORAL DAILY
Status: DISCONTINUED | OUTPATIENT
Start: 2024-05-16 | End: 2024-05-21 | Stop reason: HOSPADM

## 2024-05-16 RX ORDER — INSULIN GLARGINE 100 [IU]/ML
10 INJECTION, SOLUTION SUBCUTANEOUS ONCE
Status: COMPLETED | OUTPATIENT
Start: 2024-05-16 | End: 2024-05-16

## 2024-05-16 RX ADMIN — INSULIN HUMAN 8 UNITS: 100 INJECTION, SOLUTION PARENTERAL at 16:08

## 2024-05-16 RX ADMIN — INSULIN GLARGINE 60 UNITS: 100 INJECTION, SOLUTION SUBCUTANEOUS at 21:46

## 2024-05-16 RX ADMIN — INSULIN HUMAN 8 UNITS: 100 INJECTION, SOLUTION PARENTERAL at 12:54

## 2024-05-16 RX ADMIN — FUROSEMIDE 40 MG: 40 TABLET ORAL at 08:10

## 2024-05-16 RX ADMIN — INSULIN GLARGINE 10 UNITS: 100 INJECTION, SOLUTION SUBCUTANEOUS at 10:30

## 2024-05-16 RX ADMIN — Medication 21 PERCENT: at 20:00

## 2024-05-16 RX ADMIN — VALSARTAN 40 MG: 80 TABLET, FILM COATED ORAL at 21:51

## 2024-05-16 RX ADMIN — INSULIN HUMAN 8 UNITS: 100 INJECTION, SOLUTION PARENTERAL at 21:46

## 2024-05-16 RX ADMIN — MAGNESIUM SULFATE HEPTAHYDRATE 1 G: 1 INJECTION, SOLUTION INTRAVENOUS at 06:34

## 2024-05-16 RX ADMIN — HEPARIN SODIUM 2300 UNITS/HR: 10000 INJECTION, SOLUTION INTRAVENOUS at 12:09

## 2024-05-16 RX ADMIN — INSULIN HUMAN 8 UNITS: 100 INJECTION, SOLUTION PARENTERAL at 08:12

## 2024-05-16 RX ADMIN — HEPARIN SODIUM 2300 UNITS/HR: 10000 INJECTION, SOLUTION INTRAVENOUS at 23:36

## 2024-05-16 ASSESSMENT — COGNITIVE AND FUNCTIONAL STATUS - GENERAL
MOBILITY SCORE: 24
MOBILITY SCORE: 24
DAILY ACTIVITIY SCORE: 24
DAILY ACTIVITIY SCORE: 24

## 2024-05-16 ASSESSMENT — PAIN SCALES - GENERAL
PAINLEVEL_OUTOF10: 0 - NO PAIN

## 2024-05-16 ASSESSMENT — PAIN - FUNCTIONAL ASSESSMENT
PAIN_FUNCTIONAL_ASSESSMENT: 0-10
PAIN_FUNCTIONAL_ASSESSMENT: 0-10

## 2024-05-16 NOTE — NURSING NOTE
"Inpatient Diabetes Education chart review:    Patient is a 54 y/o male came to ED on 5/14/24 at 09:42 with c/o \"SOB X 3 days\" BS in ED was 383 mg/dl.  Found to have an acute pulmonary embolism    H/o: venous thromboembolism, dyslipidemia, T2DM, with neuropathy, diabetic foot, anemia, obesity     While inpatient getting Lantus 10 units X 1  Regular insulin SSI 0-10 units 4 times a day  Lantus 40 units at bedtime    POCT glucose today 5/16/24 at 07:15 was 302 mg/dl.      HbA1c 10.7% dated 5/14/24    Will continue to monitor POCT glucose  "

## 2024-05-16 NOTE — PROGRESS NOTES
Mina Huitron is a 53 y.o. male on day 2 of admission presenting with Acute pulmonary embolism, unspecified pulmonary embolism type, unspecified whether acute cor pulmonale present (Multi).  Patient with VTE, HTN, DLP, DM with neuropathy and diabetic foot, chronic anemia, obesity, who presented with SOB x few days. In the ED work up revealed b/l PE. PERT team was consulted who recommended heparin drip and ICU monitoring. Subsequently is being admitted to ICU.   Subjective   No acute overnight events. However glycemic control has been sub-optimal. Oxygenation improved and is taken off NC.     Overall continues to feel better. Denies SOB or YANCEY with ADLs or short walks.  Denies palpitation or LH. No pains or any other complaints.   Objective   Scheduled medications  furosemide, 40 mg, oral, Daily  insulin glargine, 40 Units, subcutaneous, Nightly  insulin regular, 0-10 Units, subcutaneous, With meals & nightly  oxygen, , inhalation, Continuous - Inhalation    Continuous medications  heparin, 0-4,500 Units/hr, Last Rate: 2,300 Units/hr (05/16/24 0618)    PRN medications  PRN medications: ammonium lactate, dextrose, dextrose, glucagon, glucagon, heparin (porcine)   Physical Exam  Constitutional:       General: He is not in acute distress.     Appearance: He is obese. He is not ill-appearing or toxic-appearing.   HENT:      Head: Normocephalic and atraumatic.      Nose:      Comments: On RA     Mouth/Throat:      Mouth: Mucous membranes are moist.      Comments: Mallampati 3.   Eyes:      General: No scleral icterus.     Extraocular Movements: Extraocular movements intact.      Pupils: Pupils are equal, round, and reactive to light.   Cardiovascular:      Rate and Rhythm: Normal rate and regular rhythm.      Heart sounds: No murmur heard.     No friction rub. No gallop.   Pulmonary:      Effort: Pulmonary effort is normal. No respiratory distress.      Breath sounds: Normal breath sounds. No wheezing or rales.  "  Abdominal:      General: Abdomen is flat. There is no distension.      Palpations: Abdomen is soft.      Tenderness: There is no abdominal tenderness.   Musculoskeletal:      Cervical back: Normal range of motion and neck supple. No rigidity or tenderness.      Right lower leg: Edema (2+) present.      Left lower leg: Edema (2+) present.   Lymphadenopathy:      Cervical: No cervical adenopathy.   Skin:     General: Skin is warm and dry.      Coloration: Skin is not jaundiced.      Findings: Rash (chronic discoloration b/l LE) present.   Neurological:      General: No focal deficit present.      Mental Status: He is alert and oriented to person, place, and time.      Cranial Nerves: No cranial nerve deficit.      Motor: No weakness.   Psychiatric:         Mood and Affect: Mood normal.         Behavior: Behavior normal.     Last Recorded Vitals  Blood pressure 145/84, pulse 77, temperature 36.5 °C (97.7 °F), temperature source Axillary, resp. rate 19, height 1.854 m (6' 1\"), weight 127 kg (280 lb 3.3 oz), SpO2 (!) 86%.  Intake/Output last 3 Shifts:  I/O last 3 completed shifts:  In: 1667.3 (13.1 mL/kg) [P.O.:620; I.V.:1047.3 (8.2 mL/kg)]  Out: 3740 (29.4 mL/kg) [Urine:3740 (0.8 mL/kg/hr)]  Weight: 127.1 kg     Relevant Results  Results for orders placed or performed during the hospital encounter of 05/14/24 (from the past 24 hour(s))   Transthoracic Echo (TTE) Complete   Result Value Ref Range    BSA 2.67 m2   POCT GLUCOSE   Result Value Ref Range    POCT Glucose 259 (H) 74 - 99 mg/dL   POCT GLUCOSE   Result Value Ref Range    POCT Glucose 324 (H) 74 - 99 mg/dL   POCT GLUCOSE   Result Value Ref Range    POCT Glucose 411 (H) 74 - 99 mg/dL   CBC and Auto Differential   Result Value Ref Range    WBC 6.7 4.4 - 11.3 x10*3/uL    nRBC 0.0 0.0 - 0.0 /100 WBCs    RBC 4.64 4.50 - 5.90 x10*6/uL    Hemoglobin 13.3 (L) 13.5 - 17.5 g/dL    Hematocrit 41.2 41.0 - 52.0 %    MCV 89 80 - 100 fL    MCH 28.7 26.0 - 34.0 pg    MCHC 32.3 " 32.0 - 36.0 g/dL    RDW 14.2 11.5 - 14.5 %    Platelets 227 150 - 450 x10*3/uL    Neutrophils % 59.2 40.0 - 80.0 %    Immature Granulocytes %, Automated 0.6 0.0 - 0.9 %    Lymphocytes % 28.0 13.0 - 44.0 %    Monocytes % 6.7 2.0 - 10.0 %    Eosinophils % 4.8 0.0 - 6.0 %    Basophils % 0.7 0.0 - 2.0 %    Neutrophils Absolute 3.98 1.20 - 7.70 x10*3/uL    Immature Granulocytes Absolute, Automated 0.04 0.00 - 0.70 x10*3/uL    Lymphocytes Absolute 1.88 1.20 - 4.80 x10*3/uL    Monocytes Absolute 0.45 0.10 - 1.00 x10*3/uL    Eosinophils Absolute 0.32 0.00 - 0.70 x10*3/uL    Basophils Absolute 0.05 0.00 - 0.10 x10*3/uL   Basic Metabolic Panel   Result Value Ref Range    Glucose 322 (H) 65 - 99 mg/dL    Sodium 136 133 - 145 mmol/L    Potassium 4.1 3.4 - 5.1 mmol/L    Chloride 100 97 - 107 mmol/L    Bicarbonate 25 24 - 31 mmol/L    Urea Nitrogen 17 8 - 25 mg/dL    Creatinine 0.90 0.40 - 1.60 mg/dL    eGFR >90 >60 mL/min/1.73m*2    Calcium 8.7 8.5 - 10.4 mg/dL    Anion Gap 11 <=19 mmol/L   Magnesium   Result Value Ref Range    Magnesium 1.90 1.60 - 3.10 mg/dL   Phosphorus   Result Value Ref Range    Phosphorus 3.1 2.5 - 4.5 mg/dL   aPTT   Result Value Ref Range    aPTT 49.3 (H) 22.0 - 32.5 seconds   POCT GLUCOSE   Result Value Ref Range    POCT Glucose 302 (H) 74 - 99 mg/dL   ECG 12 lead    Result Date: 5/15/2024  Sinus tachycardia Otherwise normal ECG No previous ECGs available Confirmed by Kole Durand [id number]) on 5/15/2024 12:16:24 PM    Vascular US lower extremity venous duplex bilateral    Result Date: 5/14/2024  Interpreted By:  Phil Tejeda, STUDY: [facility] US LOWER EXTREMITY VENOUS DUPLEX BILATERAL  5/14/2024 5:10 pm   INDICATION: 54 y/o   M with  Signs/Symptoms:bilateral PEs. LMP:  Unknown.   COMPARISON: None.   ACCESSION NUMBER(S): [id number]   ORDERING CLINICIAN: NICKOLAS BOYKIN   TECHNIQUE: Routine ultrasound of the  bilateral lower extremities was performed with duplex Doppler (color and spectral) evaluation.    Static images were obtained for remote interpretation.     FINDINGS: Right lower extremity: All visualized deep veins in the right lower extremity are patent with no thrombosis. The veins are compressible and demonstrate normal augmentation.   Left lower extremity: The junction of the greater saphenous and common femoral veins is not compressible, which may be due to nonocclusive thrombosis. All other deep veins in the femoropopliteal segment of the left lower extremity are patent and compressible with no evidence of thrombosis. The left calf veins could not be identified.       Questionable nonocclusive thrombosis at the junction of the left greater saphenous vein and common femoral vein.   No DVT in the right lower extremity.   MACRO: None   Signed by: Phil Tejeda 5/14/2024 5:49 PM Dictation workstation:   [id number]    CT angio chest for pulmonary embolism    Result Date: 5/14/2024  Interpreted By:  Cameron Navarro, STUDY: CT ANGIO CHEST FOR PULMONARY EMBOLISM; 5/14/2024 11:16 am   INDICATION: Signs/Symptoms:short of breath, tachycardic, history of PE.   COMPARISON: None   ACCESSION NUMBER(S): [id number]   ORDERING CLINICIAN: NICKOLAS BOYKIN   TECHNIQUE: Contiguous axial images of the thorax were obtained from the level of the thoracic inlet through the lung bases. 3-D MIPS were created, processed and reviewed on a separate workstation. 100 ml of Omnipaque 350 was utilized. All CT examinations are performed with 1 or more of the following dose reduction techniques: Automated exposure control, adjustment of mA and/or kv according to patient's size, or use of iterative reconstruction techniques.   FINDINGS: The thyroid gland is unremarkable.   There is adequate contrast opacification of the pulmonary arterial vasculature. Extensive filling defects consistent with pulmonary emboli are seen bilaterally. There is prominent pulmonary embolus at the distal main right and distal main left pulmonary emboli with  extensive thrombus extending throughout the majority of segmental and subsegmental branches bilaterally. However, no evidence for right heart strain or significant septal deviation.   The heart size is within normal limits.  No pericardial effusion is identified. The aorta and pulmonary arteries are normal in caliber.   There are no pathologically enlarged mediastinal, hilar, or axillary lymph nodes are seen.   The trachea and mainstem bronchi are patent. No suspicious pulmonary nodules are seen. No significant consolidation. There is no evidence of pneumothorax or pleural effusion.   The visualized osseous structures are intact.   Limited images through the upper abdomen are unremarkable.       1. (+) PE.  Extensive bilateral pulmonary emboli. No evidence of right heart strain. 2. No significant airspace opacity or consolidation.     Signed by: Cameron Navarro 5/14/2024 11:49 AM Dictation workstation:   [id number]    Assessment/Plan   Principal Problem:    Acute pulmonary embolism, unspecified pulmonary embolism type, unspecified whether acute cor pulmonale present (Multi)  53 YOM with VTE, HTN, DLP, DM with neuropathy and diabetic foot, chronic anemia, obesity, who presented with SOB x few days. In the ED work up revealed b/l PE. PERT team was consulted who recommended heparin drip and ICU monitoring. Subsequently is being admitted to ICU.      Neuro: no acute issues, h/o diabetic neuropathy. Not on any pain medication.      Supportive measures      Pain management as needed     CV: HTN, DLP. Presented with tachycardia from PE, now resolved. BP slightly elevated but acceptable range      Hold home BP medications, can resume slowly      Continue diuresis       Continue to monitor     Pulmonary: h/o VTE, now with acute PE c/b acute hypoxic respiratory failure. With significant clot burden consistent with submassive PE (b/l PE, elevated BNP, troponin, +ve LE DVT, tachycardia, and hypoxia)      PERT team contacted,  not an candidate for thrombectomy at this time      continue heparin drip, can transition to OAC later today       supplemental O2 as needed      Will need home O2 evaluation before discharge.       Will need lifelong anticoagulation      2D echo done, read is pending.      : no acute issues      Is/Os      Daily RFP      Treat electrolytes abnormalities as indicated     GI: no acute issues      Started on a diabetic diet        Endo: DM with elevated BS      Will continue Lantus, with increased dose to 60 U      Will give extra-dose of Lantus 10 mg this am      Moderate SSI      Hypoglycemic protocol     Hem/Onc: h/o chronic anemia, Hb slightly low, but overall stable.       Continue to monitor with daily CBC     ID: no findings suggestive of infection      Continue to monitor for S&S infection     The patient is stable and can be transferred to medical floor.     Jon Carney MD

## 2024-05-16 NOTE — PROGRESS NOTES
Patient not medically clear. Podiatry following. Patient remains on heparin drip. At the time of discharge, patient will return home with no skilled services. Patient does not have insurance and does not qualify for Medicaid. Will follow.      05/16/24 1325   Discharge Planning   Home or Post Acute Services None   Patient expects to be discharged to: Home   Does the patient need discharge transport arranged? No

## 2024-05-16 NOTE — CARE PLAN
The patient's goals for the shift include      The clinical goals for the shift include pain control, hemodynamically stable, no bleeding      Problem: Pain  Goal: Takes deep breaths with improved pain control throughout the shift  Outcome: Progressing  Goal: Turns in bed with improved pain control throughout the shift  Outcome: Progressing  Goal: Walks with improved pain control throughout the shift  Outcome: Progressing  Goal: Performs ADL's with improved pain control throughout shift  Outcome: Progressing  Goal: Participates in PT with improved pain control throughout the shift  Outcome: Progressing  Goal: Free from opioid side effects throughout the shift  Outcome: Progressing  Goal: Free from acute confusion related to pain meds throughout the shift  Outcome: Progressing     Problem: Pain  Goal: My pain/discomfort is manageable  Outcome: Progressing     Problem: Safety  Goal: Patient will be injury free during hospitalization  Outcome: Progressing  Goal: I will remain free of falls  Outcome: Progressing     Problem: Daily Care  Goal: Daily care needs are met  Outcome: Progressing     Problem: Psychosocial Needs  Goal: Demonstrates ability to cope with hospitalization/illness  Outcome: Progressing  Goal: Collaborate with me, my family, and caregiver to identify my specific goals  Outcome: Progressing     Problem: Discharge Barriers  Goal: My discharge needs are met  Outcome: Progressing     Problem: Skin  Goal: Decreased wound size/increased tissue granulation at next dressing change  Outcome: Progressing  Flowsheets (Taken 5/16/2024 1010)  Decreased wound size/increased tissue granulation at next dressing change:   Protective dressings over bony prominences   Promote sleep for wound healing  Goal: Participates in plan/prevention/treatment measures  Outcome: Progressing  Flowsheets (Taken 5/16/2024 1010)  Participates in plan/prevention/treatment measures:   Discuss with provider PT/OT consult   Increase  activity/out of bed for meals  Goal: Prevent/manage excess moisture  Outcome: Progressing  Flowsheets (Taken 5/16/2024 1010)  Prevent/manage excess moisture:   Follow provider orders for dressing changes   Monitor for/manage infection if present  Goal: Prevent/minimize sheer/friction injuries  Outcome: Progressing  Flowsheets (Taken 5/16/2024 1010)  Prevent/minimize sheer/friction injuries:   Increase activity/out of bed for meals   HOB 30 degrees or less   Turn/reposition every 2 hours/use positioning/transfer devices  Goal: Promote/optimize nutrition  Outcome: Progressing  Flowsheets (Taken 5/16/2024 1010)  Promote/optimize nutrition:   Offer water/supplements/favorite foods   Consume > 50% meals/supplements   Monitor/record intake including meals  Goal: Promote skin healing  Outcome: Progressing  Flowsheets (Taken 5/16/2024 1010)  Promote skin healing: Turn/reposition every 2 hours/use positioning/transfer devices

## 2024-05-16 NOTE — CARE PLAN
Problem: Pain  Goal: Takes deep breaths with improved pain control throughout the shift  Outcome: Progressing  Goal: Turns in bed with improved pain control throughout the shift  Outcome: Progressing  Goal: Walks with improved pain control throughout the shift  Outcome: Progressing  Goal: Performs ADL's with improved pain control throughout shift  Outcome: Progressing  Goal: Participates in PT with improved pain control throughout the shift  Outcome: Progressing  Goal: Free from opioid side effects throughout the shift  Outcome: Progressing  Goal: Free from acute confusion related to pain meds throughout the shift  Outcome: Progressing     Problem: Pain  Goal: My pain/discomfort is manageable  Outcome: Progressing     Problem: Safety  Goal: Patient will be injury free during hospitalization  Outcome: Progressing  Goal: I will remain free of falls  Outcome: Progressing     Problem: Skin  Goal: Decreased wound size/increased tissue granulation at next dressing change  Outcome: Progressing  Flowsheets (Taken 5/15/2024 2013)  Decreased wound size/increased tissue granulation at next dressing change:   Protective dressings over bony prominences   Promote sleep for wound healing  Goal: Participates in plan/prevention/treatment measures  Outcome: Progressing  Flowsheets (Taken 5/15/2024 2013)  Participates in plan/prevention/treatment measures: Elevate heels  Goal: Prevent/manage excess moisture  Outcome: Progressing  Flowsheets (Taken 5/15/2024 2013)  Prevent/manage excess moisture:   Moisturize dry skin   Monitor for/manage infection if present  Goal: Prevent/minimize sheer/friction injuries  Outcome: Progressing  Flowsheets (Taken 5/15/2024 2013)  Prevent/minimize sheer/friction injuries:   HOB 30 degrees or less   Turn/reposition every 2 hours/use positioning/transfer devices   Use pull sheet  Goal: Promote/optimize nutrition  Outcome: Progressing  Flowsheets (Taken 5/15/2024 2013)  Promote/optimize nutrition:  Monitor/record intake including meals  Goal: Promote skin healing  Outcome: Progressing  Flowsheets (Taken 5/15/2024 2013)  Promote skin healing: Turn/reposition every 2 hours/use positioning/transfer devices   The patient's goals for the shift include      The clinical goals for the shift include pain control, hemodynamically stable, no bleeding

## 2024-05-16 NOTE — NURSING NOTE
Patient to the floor from ICU with belongings.  Heparin infusing without difficulty.  Will continue to monitor patient.

## 2024-05-17 ENCOUNTER — APPOINTMENT (OUTPATIENT)
Dept: CARDIOLOGY | Facility: HOSPITAL | Age: 54
End: 2024-05-17

## 2024-05-17 ENCOUNTER — APPOINTMENT (OUTPATIENT)
Dept: RADIOLOGY | Facility: HOSPITAL | Age: 54
End: 2024-05-17

## 2024-05-17 LAB
ANION GAP BLDA CALCULATED.4IONS-SCNC: 6 MMO/L (ref 10–25)
ANION GAP SERPL CALC-SCNC: 10 MMOL/L
APTT PPP: 29.1 SECONDS (ref 22–32.5)
APTT PPP: 31.9 SECONDS (ref 22–32.5)
APTT PPP: 53.8 SECONDS (ref 22–32.5)
ARTERIAL PATENCY WRIST A: POSITIVE
BASE EXCESS BLDA CALC-SCNC: 4.7 MMOL/L (ref -2–3)
BASOPHILS # BLD AUTO: 0.04 X10*3/UL (ref 0–0.1)
BASOPHILS NFR BLD AUTO: 0.5 %
BODY TEMPERATURE: 37 DEGREES CELSIUS
BUN SERPL-MCNC: 15 MG/DL (ref 8–25)
CA-I BLDA-SCNC: 1.21 MMOL/L (ref 1.1–1.33)
CALCIUM SERPL-MCNC: 9.1 MG/DL (ref 8.5–10.4)
CHLORIDE BLDA-SCNC: 101 MMOL/L (ref 98–107)
CHLORIDE SERPL-SCNC: 101 MMOL/L (ref 97–107)
CO2 SERPL-SCNC: 28 MMOL/L (ref 24–31)
CREAT SERPL-MCNC: 0.9 MG/DL (ref 0.4–1.6)
EGFRCR SERPLBLD CKD-EPI 2021: >90 ML/MIN/1.73M*2
EOSINOPHIL # BLD AUTO: 0.34 X10*3/UL (ref 0–0.7)
EOSINOPHIL NFR BLD AUTO: 4.6 %
ERYTHROCYTE [DISTWIDTH] IN BLOOD BY AUTOMATED COUNT: 14.2 % (ref 11.5–14.5)
GLUCOSE BLD MANUAL STRIP-MCNC: 220 MG/DL (ref 74–99)
GLUCOSE BLD MANUAL STRIP-MCNC: 224 MG/DL (ref 74–99)
GLUCOSE BLD MANUAL STRIP-MCNC: 294 MG/DL (ref 74–99)
GLUCOSE BLD MANUAL STRIP-MCNC: 385 MG/DL (ref 74–99)
GLUCOSE BLD MANUAL STRIP-MCNC: 439 MG/DL (ref 74–99)
GLUCOSE BLDA-MCNC: 366 MG/DL (ref 74–99)
GLUCOSE SERPL-MCNC: 207 MG/DL (ref 65–99)
HCO3 BLDA-SCNC: 28.2 MMOL/L (ref 22–26)
HCT VFR BLD AUTO: 43.7 % (ref 41–52)
HCT VFR BLD EST: 43 % (ref 41–52)
HGB BLD-MCNC: 14.1 G/DL (ref 13.5–17.5)
HGB BLDA-MCNC: 14.4 G/DL (ref 13.5–17.5)
IMM GRANULOCYTES # BLD AUTO: 0.04 X10*3/UL (ref 0–0.7)
IMM GRANULOCYTES NFR BLD AUTO: 0.5 % (ref 0–0.9)
INHALED O2 CONCENTRATION: 24 %
LACTATE BLDA-SCNC: 1.6 MMOL/L (ref 0.4–2)
LYMPHOCYTES # BLD AUTO: 2.36 X10*3/UL (ref 1.2–4.8)
LYMPHOCYTES NFR BLD AUTO: 31.8 %
MAGNESIUM SERPL-MCNC: 1.8 MG/DL (ref 1.6–3.1)
MCH RBC QN AUTO: 28.5 PG (ref 26–34)
MCHC RBC AUTO-ENTMCNC: 32.3 G/DL (ref 32–36)
MCV RBC AUTO: 89 FL (ref 80–100)
MONOCYTES # BLD AUTO: 0.57 X10*3/UL (ref 0.1–1)
MONOCYTES NFR BLD AUTO: 7.7 %
NEUTROPHILS # BLD AUTO: 4.06 X10*3/UL (ref 1.2–7.7)
NEUTROPHILS NFR BLD AUTO: 54.9 %
NRBC BLD-RTO: 0 /100 WBCS (ref 0–0)
OXYHGB MFR BLDA: 92.1 % (ref 94–98)
PCO2 BLDA: 37 MM HG (ref 38–42)
PH BLDA: 7.49 PH (ref 7.38–7.42)
PHOSPHATE SERPL-MCNC: 3.4 MG/DL (ref 2.5–4.5)
PLATELET # BLD AUTO: 238 X10*3/UL (ref 150–450)
PO2 BLDA: 62 MM HG (ref 85–95)
POTASSIUM BLDA-SCNC: 3.6 MMOL/L (ref 3.5–5.3)
POTASSIUM SERPL-SCNC: 4.2 MMOL/L (ref 3.4–5.1)
RBC # BLD AUTO: 4.94 X10*6/UL (ref 4.5–5.9)
SAO2 % BLDA: 94 % (ref 94–100)
SODIUM BLDA-SCNC: 132 MMOL/L (ref 136–145)
SODIUM SERPL-SCNC: 139 MMOL/L (ref 133–145)
SPECIMEN DRAWN FROM PATIENT: ABNORMAL
TROPONIN T SERPL-MCNC: 15 NG/L
WBC # BLD AUTO: 7.4 X10*3/UL (ref 4.4–11.3)

## 2024-05-17 PROCEDURE — 71045 X-RAY EXAM CHEST 1 VIEW: CPT | Performed by: RADIOLOGY

## 2024-05-17 PROCEDURE — 2500000001 HC RX 250 WO HCPCS SELF ADMINISTERED DRUGS (ALT 637 FOR MEDICARE OP): Performed by: INTERNAL MEDICINE

## 2024-05-17 PROCEDURE — 84484 ASSAY OF TROPONIN QUANT: CPT | Performed by: NURSE PRACTITIONER

## 2024-05-17 PROCEDURE — 2500000006 HC RX 250 W HCPCS SELF ADMINISTERED DRUGS (ALT 637 FOR ALL PAYERS): Performed by: INTERNAL MEDICINE

## 2024-05-17 PROCEDURE — 2500000002 HC RX 250 W HCPCS SELF ADMINISTERED DRUGS (ALT 637 FOR MEDICARE OP, ALT 636 FOR OP/ED): Performed by: INTERNAL MEDICINE

## 2024-05-17 PROCEDURE — 71045 X-RAY EXAM CHEST 1 VIEW: CPT

## 2024-05-17 PROCEDURE — 84100 ASSAY OF PHOSPHORUS: CPT | Performed by: INTERNAL MEDICINE

## 2024-05-17 PROCEDURE — 36600 WITHDRAWAL OF ARTERIAL BLOOD: CPT

## 2024-05-17 PROCEDURE — 93005 ELECTROCARDIOGRAM TRACING: CPT

## 2024-05-17 PROCEDURE — 2500000005 HC RX 250 GENERAL PHARMACY W/O HCPCS: Performed by: INTERNAL MEDICINE

## 2024-05-17 PROCEDURE — 85025 COMPLETE CBC W/AUTO DIFF WBC: CPT | Performed by: INTERNAL MEDICINE

## 2024-05-17 PROCEDURE — 99223 1ST HOSP IP/OBS HIGH 75: CPT | Performed by: INTERNAL MEDICINE

## 2024-05-17 PROCEDURE — 80048 BASIC METABOLIC PNL TOTAL CA: CPT | Performed by: INTERNAL MEDICINE

## 2024-05-17 PROCEDURE — 2500000004 HC RX 250 GENERAL PHARMACY W/ HCPCS (ALT 636 FOR OP/ED): Performed by: INTERNAL MEDICINE

## 2024-05-17 PROCEDURE — 9420000001 HC RT PATIENT EDUCATION 5 MIN

## 2024-05-17 PROCEDURE — 1200000002 HC GENERAL ROOM WITH TELEMETRY DAILY

## 2024-05-17 PROCEDURE — 84132 ASSAY OF SERUM POTASSIUM: CPT | Performed by: NURSE PRACTITIONER

## 2024-05-17 PROCEDURE — 36415 COLL VENOUS BLD VENIPUNCTURE: CPT | Performed by: INTERNAL MEDICINE

## 2024-05-17 PROCEDURE — 85730 THROMBOPLASTIN TIME PARTIAL: CPT | Performed by: INTERNAL MEDICINE

## 2024-05-17 PROCEDURE — 83735 ASSAY OF MAGNESIUM: CPT | Performed by: INTERNAL MEDICINE

## 2024-05-17 PROCEDURE — 82947 ASSAY GLUCOSE BLOOD QUANT: CPT

## 2024-05-17 RX ORDER — ACETAMINOPHEN 325 MG/1
650 TABLET ORAL EVERY 6 HOURS PRN
Status: DISCONTINUED | OUTPATIENT
Start: 2024-05-17 | End: 2024-05-21 | Stop reason: HOSPADM

## 2024-05-17 RX ADMIN — FUROSEMIDE 40 MG: 40 TABLET ORAL at 08:40

## 2024-05-17 RX ADMIN — HEPARIN SODIUM 3100 UNITS/HR: 10000 INJECTION, SOLUTION INTRAVENOUS at 16:25

## 2024-05-17 RX ADMIN — Medication 2 L/MIN: at 20:00

## 2024-05-17 RX ADMIN — INSULIN HUMAN 4 UNITS: 100 INJECTION, SOLUTION PARENTERAL at 08:38

## 2024-05-17 RX ADMIN — VALSARTAN 40 MG: 80 TABLET, FILM COATED ORAL at 08:41

## 2024-05-17 RX ADMIN — INSULIN HUMAN 4 UNITS: 100 INJECTION, SOLUTION PARENTERAL at 12:27

## 2024-05-17 RX ADMIN — INSULIN GLARGINE 60 UNITS: 100 INJECTION, SOLUTION SUBCUTANEOUS at 21:45

## 2024-05-17 RX ADMIN — INSULIN HUMAN 10 UNITS: 100 INJECTION, SOLUTION PARENTERAL at 21:44

## 2024-05-17 RX ADMIN — HEPARIN SODIUM 2700 UNITS/HR: 10000 INJECTION, SOLUTION INTRAVENOUS at 14:50

## 2024-05-17 RX ADMIN — Medication 24 PERCENT: at 08:00

## 2024-05-17 RX ADMIN — ACETAMINOPHEN 650 MG: 325 TABLET ORAL at 17:06

## 2024-05-17 RX ADMIN — INSULIN HUMAN 6 UNITS: 100 INJECTION, SOLUTION PARENTERAL at 16:58

## 2024-05-17 ASSESSMENT — PAIN DESCRIPTION - LOCATION: LOCATION: HEAD

## 2024-05-17 ASSESSMENT — PAIN SCALES - GENERAL: PAINLEVEL_OUTOF10: 3

## 2024-05-17 NOTE — CARE PLAN
The patient's goals for the shift include      The clinical goals for the shift include pain control, hemodynamically stable, no bleeding.    Over the shift, the patient did not make progress toward the following goals. Barriers to progression include . Recommendations to address these barriers include .

## 2024-05-17 NOTE — PROGRESS NOTES
Pt continues on heparin drip. Hematology/oncology consulted.      05/17/24 5871   Discharge Planning   Patient expects to be discharged to: Home

## 2024-05-17 NOTE — PROGRESS NOTES
Mina Huitron is a 53 y.o. male on day 3 of admission presenting with Acute pulmonary embolism, unspecified pulmonary embolism type, unspecified whether acute cor pulmonale present (Multi).    Subjective   Patient seen and examined.  Resting sitting up in the chair in no acute distress.  Eating breakfast.  Awake alert oriented x 3.  No complaints.    Spoke with nursing, per report non-compliant with weight-bearing restrictions.    Objective     Physical Exam  Vitals and nursing note reviewed.   Constitutional:       General: He is not in acute distress.     Appearance: Normal appearance. He is obese. He is not ill-appearing, toxic-appearing or diaphoretic.   HENT:      Head: Normocephalic and atraumatic.      Right Ear: Tympanic membrane normal.      Left Ear: Tympanic membrane normal.      Nose: Nose normal.      Mouth/Throat:      Mouth: Mucous membranes are moist.      Pharynx: Oropharynx is clear.   Eyes:      Extraocular Movements: Extraocular movements intact.      Conjunctiva/sclera: Conjunctivae normal.      Pupils: Pupils are equal, round, and reactive to light.   Cardiovascular:      Rate and Rhythm: Normal rate and regular rhythm.      Pulses: Normal pulses.      Heart sounds: Normal heart sounds. No murmur heard.  Pulmonary:      Effort: Pulmonary effort is normal. No respiratory distress.      Breath sounds: Normal breath sounds. No wheezing, rhonchi or rales.      Comments: Room air.  Abdominal:      General: Bowel sounds are normal. There is no distension.      Palpations: Abdomen is soft.      Tenderness: There is no abdominal tenderness.   Genitourinary:     Comments: Deferred.  Musculoskeletal:         General: Swelling present. No tenderness. Normal range of motion.      Cervical back: Normal range of motion and neck supple.      Right lower leg: Edema present.      Left lower leg: Edema present.   Skin:     General: Skin is warm and dry.      Capillary Refill: Capillary refill takes less than 2  "seconds.      Comments: Right lower extremity foot dressing clean dry intact.   Neurological:      General: No focal deficit present.      Mental Status: He is alert and oriented to person, place, and time.   Psychiatric:         Mood and Affect: Mood normal.         Behavior: Behavior normal.       Last Recorded Vitals  Blood pressure 153/76, pulse 82, temperature 36.7 °C (98.1 °F), temperature source Oral, resp. rate 22, height 1.854 m (6' 1\"), weight 133 kg (292 lb 15.9 oz), SpO2 90%.    Intake/Output last 3 Shifts:  I/O last 3 completed shifts:  In: 1567.9 (11.8 mL/kg) [P.O.:400; I.V.:1167.9 (8.8 mL/kg)]  Out: 1750 (13.2 mL/kg) [Urine:1750 (0.4 mL/kg/hr)]  Weight: 132.9 kg     Telemetry normal sinus rhythm rate 80's    Relevant Results  Results for orders placed or performed during the hospital encounter of 05/14/24 (from the past 24 hour(s))   POCT GLUCOSE   Result Value Ref Range    POCT Glucose 304 (H) 74 - 99 mg/dL   POCT GLUCOSE   Result Value Ref Range    POCT Glucose 343 (H) 74 - 99 mg/dL   POCT GLUCOSE   Result Value Ref Range    POCT Glucose 316 (H) 74 - 99 mg/dL   POCT GLUCOSE   Result Value Ref Range    POCT Glucose 312 (H) 74 - 99 mg/dL   CBC and Auto Differential   Result Value Ref Range    WBC 7.4 4.4 - 11.3 x10*3/uL    nRBC 0.0 0.0 - 0.0 /100 WBCs    RBC 4.94 4.50 - 5.90 x10*6/uL    Hemoglobin 14.1 13.5 - 17.5 g/dL    Hematocrit 43.7 41.0 - 52.0 %    MCV 89 80 - 100 fL    MCH 28.5 26.0 - 34.0 pg    MCHC 32.3 32.0 - 36.0 g/dL    RDW 14.2 11.5 - 14.5 %    Platelets 238 150 - 450 x10*3/uL    Neutrophils % 54.9 40.0 - 80.0 %    Immature Granulocytes %, Automated 0.5 0.0 - 0.9 %    Lymphocytes % 31.8 13.0 - 44.0 %    Monocytes % 7.7 2.0 - 10.0 %    Eosinophils % 4.6 0.0 - 6.0 %    Basophils % 0.5 0.0 - 2.0 %    Neutrophils Absolute 4.06 1.20 - 7.70 x10*3/uL    Immature Granulocytes Absolute, Automated 0.04 0.00 - 0.70 x10*3/uL    Lymphocytes Absolute 2.36 1.20 - 4.80 x10*3/uL    Monocytes Absolute 0.57 " 0.10 - 1.00 x10*3/uL    Eosinophils Absolute 0.34 0.00 - 0.70 x10*3/uL    Basophils Absolute 0.04 0.00 - 0.10 x10*3/uL   Basic Metabolic Panel   Result Value Ref Range    Glucose 207 (H) 65 - 99 mg/dL    Sodium 139 133 - 145 mmol/L    Potassium 4.2 3.4 - 5.1 mmol/L    Chloride 101 97 - 107 mmol/L    Bicarbonate 28 24 - 31 mmol/L    Urea Nitrogen 15 8 - 25 mg/dL    Creatinine 0.90 0.40 - 1.60 mg/dL    eGFR >90 >60 mL/min/1.73m*2    Calcium 9.1 8.5 - 10.4 mg/dL    Anion Gap 10 <=19 mmol/L   Magnesium   Result Value Ref Range    Magnesium 1.80 1.60 - 3.10 mg/dL   Phosphorus   Result Value Ref Range    Phosphorus 3.4 2.5 - 4.5 mg/dL   aPTT   Result Value Ref Range    aPTT 29.1 22.0 - 32.5 seconds   POCT GLUCOSE   Result Value Ref Range    POCT Glucose 220 (H) 74 - 99 mg/dL     Susceptibility data from last 90 days.  Collected Specimen Info Organism   05/15/24 Tissue/Biopsy from Diabetic Foot Ulcer Mixed Gram-Positive Bacteria        Scheduled medications  furosemide, 40 mg, oral, Daily  insulin glargine, 60 Units, subcutaneous, Nightly  insulin regular, 0-10 Units, subcutaneous, With meals & nightly  oxygen, , inhalation, Continuous - Inhalation  valsartan, 40 mg, oral, Daily      Continuous medications  heparin, 0-4,500 Units/hr, Last Rate: 2,700 Units/hr (05/17/24 0652)      PRN medications  PRN medications: ammonium lactate, carvedilol, dextrose, dextrose, glucagon, glucagon, heparin (porcine)      ASSESSMENT:  Acute deep vein thrombosis  Acute bilateral pulmonary embolism  Hypertension  Hyperlipidemia  Type 2 diabetes mellitus with hyperglycemia  Anemia chronic disease  Obesity  Chronic diabetic foot ulceration    PLAN:  IV Heparin drip per protocol.  Hematology/Oncology consultation.  Management per recommendations.  Respiratory status, pulse oximetry stable on room air.  Oxygen p.r.n.  Monitor.  Wound care, dressing management per Podiatry - wound care nursing.  Follow-up wound culture.  Point of care glucose  reviewed.  Continue insulin.  Adjust insulin as needed for glycemic control.  ADA diet.  Diabetes education.  Input appreciated.  Outpatient follow-up.  Diet, weight loss, exercise education.  GI prophylaxis.  PT/OT.  Fall precautions.  Up with assistance.  Supportive care.  Patient reassured.  Discussed with Dr. Cuadra.      ADDENDUM:  Per nursing, patient with intermittent palpitations heart rate up to 120 bpm, resolved, pulse oximetry 87% placed on 4 liters nasal cannula pulse oximetry up to 95% placed on 2 liters nasal cannula.  Orders placed for stat arterial blood gases, chest-xray and Pulmonology consultation.  Discussed with Pulmonology, Dr. Hutchinson.    Discussed with Hematology/Oncology.  Input appreciated.  Management per recommendations.  Discharge plan on Lovenox 120 mg subcutaneous every 12 hours.  Not stable for discharge.  Plan to switch to Lovenox tonight states will place orders.  Maybe discharge home over the weekend.  Input appreciated.      On re-examination, patient is resting in bed in no acute distress on 2 liters nasal cannula, feels better, denies chest pain, shortness of breath.  RT bedside to obtain arterial blood gases.  Pulmonology, Dr. Hutchinson on nursing floor.  12 lead EKG reviewed with Dr. Cuadra.  Orders placed for bedrest.  Discussed with Dr. Cuadra.        Rosalva Sinha, MJ-CNP

## 2024-05-17 NOTE — CONSULTS
"Inpatient consult to Hematology-Oncology  Consult performed by: Lanette Watts MD  Consult ordered by: MJ Terry-CNP  Reason for consult: Pulmonary emboli  Assessment/Recommendations: P/w worsening dyspnea over the last few weeks + L thigh pain. Found to have hypoxic => CTA Chest showed b/l extensive PE. Duplex showed LLE proximal DVT. Started on v heparin. Felt better but still short of breath.     He had had a PE in 2018 and was treated with anti-coagulation for 6 months. Had not had any issues since then. Denies any recent trauma, surgery or immobilization. His work involves sitting for extended periods of time.     This is an unproveoked second major VTE => Needs lifelong anti-coagulation. Does not require thrombophilia work-up.     He can be switched to enoxaparin 120 mg subQ q12hrs. We will plan to switch him to Eliquis after a month. He is still short of breath. I recommend keeping him inpatient until his respiratory fxn improves.    He has uncontrolled DM, peripheral neuropathy, and a R foot ulcer.           Reason For Consult  Pulmonary emboli    History Of Present Illness  Mina Huitron is a 53 y.o. male presenting with dyspnea without chest pain and L thigh pain.      Past Medical History  He has no past medical history on file.    Surgical History  He has no past surgical history on file.     Social History  He reports that he has never smoked. He has never been exposed to tobacco smoke. He has never used smokeless tobacco. He reports that he does not drink alcohol and does not use drugs.    Family History  No family history on file.     Allergies  Patient has no known allergies.    Review of Systems     Physical Exam     Last Recorded Vitals  Blood pressure 139/69, pulse 82, temperature 36.8 °C (98.2 °F), temperature source Oral, resp. rate 20, height 1.854 m (6' 1\"), weight 133 kg (292 lb 15.9 oz), SpO2 (!) 87%.    Relevant Results  See above     Assessment/Plan     See above    I " spent 60 minutes in the professional and overall care of this patient.

## 2024-05-17 NOTE — NURSING NOTE
Inpatient Diabetes Education:  POCT glucose today 5/17/24 at 12:06 was 224 mg/dl and at 15:27 was 294 mg/dl    Change in medication:  Lantus 60 units at bedtime  Still getting  Regular insulin SSI 0-10 units 4 times a day

## 2024-05-18 LAB
ANION GAP SERPL CALC-SCNC: 12 MMOL/L
APTT PPP: 48.2 SECONDS (ref 22–32.5)
APTT PPP: 68.3 SECONDS (ref 22–32.5)
BASOPHILS # BLD AUTO: 0.04 X10*3/UL (ref 0–0.1)
BASOPHILS NFR BLD AUTO: 0.4 %
BUN SERPL-MCNC: 17 MG/DL (ref 8–25)
CALCIUM SERPL-MCNC: 9.5 MG/DL (ref 8.5–10.4)
CHLORIDE SERPL-SCNC: 98 MMOL/L (ref 97–107)
CO2 SERPL-SCNC: 26 MMOL/L (ref 24–31)
CREAT SERPL-MCNC: 1 MG/DL (ref 0.4–1.6)
EGFRCR SERPLBLD CKD-EPI 2021: 90 ML/MIN/1.73M*2
EOSINOPHIL # BLD AUTO: 0.19 X10*3/UL (ref 0–0.7)
EOSINOPHIL NFR BLD AUTO: 1.9 %
ERYTHROCYTE [DISTWIDTH] IN BLOOD BY AUTOMATED COUNT: 14.4 % (ref 11.5–14.5)
GLUCOSE BLD MANUAL STRIP-MCNC: 256 MG/DL (ref 74–99)
GLUCOSE BLD MANUAL STRIP-MCNC: 273 MG/DL (ref 74–99)
GLUCOSE BLD MANUAL STRIP-MCNC: 284 MG/DL (ref 74–99)
GLUCOSE BLD MANUAL STRIP-MCNC: 286 MG/DL (ref 74–99)
GLUCOSE BLD MANUAL STRIP-MCNC: 286 MG/DL (ref 74–99)
GLUCOSE SERPL-MCNC: 275 MG/DL (ref 65–99)
HCT VFR BLD AUTO: 45.5 % (ref 41–52)
HGB BLD-MCNC: 14.9 G/DL (ref 13.5–17.5)
HOLD SPECIMEN: NORMAL
IMM GRANULOCYTES # BLD AUTO: 0.07 X10*3/UL (ref 0–0.7)
IMM GRANULOCYTES NFR BLD AUTO: 0.7 % (ref 0–0.9)
LYMPHOCYTES # BLD AUTO: 2.28 X10*3/UL (ref 1.2–4.8)
LYMPHOCYTES NFR BLD AUTO: 22.3 %
MAGNESIUM SERPL-MCNC: 1.7 MG/DL (ref 1.6–3.1)
MAGNESIUM SERPL-MCNC: 2 MG/DL (ref 1.6–3.1)
MCH RBC QN AUTO: 28.7 PG (ref 26–34)
MCHC RBC AUTO-ENTMCNC: 32.7 G/DL (ref 32–36)
MCV RBC AUTO: 88 FL (ref 80–100)
MONOCYTES # BLD AUTO: 0.78 X10*3/UL (ref 0.1–1)
MONOCYTES NFR BLD AUTO: 7.6 %
NEUTROPHILS # BLD AUTO: 6.85 X10*3/UL (ref 1.2–7.7)
NEUTROPHILS NFR BLD AUTO: 67.1 %
NRBC BLD-RTO: 0 /100 WBCS (ref 0–0)
PHOSPHATE SERPL-MCNC: 3.8 MG/DL (ref 2.5–4.5)
PLATELET # BLD AUTO: 259 X10*3/UL (ref 150–450)
POTASSIUM SERPL-SCNC: 3.8 MMOL/L (ref 3.4–5.1)
RBC # BLD AUTO: 5.2 X10*6/UL (ref 4.5–5.9)
SODIUM SERPL-SCNC: 136 MMOL/L (ref 133–145)
WBC # BLD AUTO: 10.2 X10*3/UL (ref 4.4–11.3)

## 2024-05-18 PROCEDURE — 2500000004 HC RX 250 GENERAL PHARMACY W/ HCPCS (ALT 636 FOR OP/ED): Performed by: INTERNAL MEDICINE

## 2024-05-18 PROCEDURE — 2500000005 HC RX 250 GENERAL PHARMACY W/O HCPCS: Performed by: INTERNAL MEDICINE

## 2024-05-18 PROCEDURE — 82374 ASSAY BLOOD CARBON DIOXIDE: CPT | Performed by: INTERNAL MEDICINE

## 2024-05-18 PROCEDURE — 36415 COLL VENOUS BLD VENIPUNCTURE: CPT | Performed by: INTERNAL MEDICINE

## 2024-05-18 PROCEDURE — 1200000002 HC GENERAL ROOM WITH TELEMETRY DAILY

## 2024-05-18 PROCEDURE — 2500000006 HC RX 250 W HCPCS SELF ADMINISTERED DRUGS (ALT 637 FOR ALL PAYERS): Performed by: INTERNAL MEDICINE

## 2024-05-18 PROCEDURE — 2500000002 HC RX 250 W HCPCS SELF ADMINISTERED DRUGS (ALT 637 FOR MEDICARE OP, ALT 636 FOR OP/ED): Performed by: INTERNAL MEDICINE

## 2024-05-18 PROCEDURE — 2500000004 HC RX 250 GENERAL PHARMACY W/ HCPCS (ALT 636 FOR OP/ED): Performed by: NURSE PRACTITIONER

## 2024-05-18 PROCEDURE — 85025 COMPLETE CBC W/AUTO DIFF WBC: CPT | Performed by: INTERNAL MEDICINE

## 2024-05-18 PROCEDURE — 94660 CPAP INITIATION&MGMT: CPT

## 2024-05-18 PROCEDURE — 84100 ASSAY OF PHOSPHORUS: CPT | Performed by: INTERNAL MEDICINE

## 2024-05-18 PROCEDURE — 9420000001 HC RT PATIENT EDUCATION 5 MIN

## 2024-05-18 PROCEDURE — 83735 ASSAY OF MAGNESIUM: CPT | Performed by: INTERNAL MEDICINE

## 2024-05-18 PROCEDURE — 2500000001 HC RX 250 WO HCPCS SELF ADMINISTERED DRUGS (ALT 637 FOR MEDICARE OP): Performed by: INTERNAL MEDICINE

## 2024-05-18 PROCEDURE — 2500000001 HC RX 250 WO HCPCS SELF ADMINISTERED DRUGS (ALT 637 FOR MEDICARE OP): Performed by: NURSE PRACTITIONER

## 2024-05-18 PROCEDURE — 85730 THROMBOPLASTIN TIME PARTIAL: CPT | Performed by: INTERNAL MEDICINE

## 2024-05-18 PROCEDURE — 94760 N-INVAS EAR/PLS OXIMETRY 1: CPT

## 2024-05-18 PROCEDURE — 82947 ASSAY GLUCOSE BLOOD QUANT: CPT

## 2024-05-18 RX ORDER — MAGNESIUM SULFATE 1 G/100ML
1 INJECTION INTRAVENOUS ONCE
Status: COMPLETED | OUTPATIENT
Start: 2024-05-18 | End: 2024-05-18

## 2024-05-18 RX ORDER — VANCOMYCIN HYDROCHLORIDE 1 G/20ML
INJECTION, POWDER, LYOPHILIZED, FOR SOLUTION INTRAVENOUS DAILY PRN
Status: DISCONTINUED | OUTPATIENT
Start: 2024-05-18 | End: 2024-05-19

## 2024-05-18 RX ORDER — INSULIN LISPRO 100 [IU]/ML
0-15 INJECTION, SOLUTION INTRAVENOUS; SUBCUTANEOUS
Status: DISCONTINUED | OUTPATIENT
Start: 2024-05-18 | End: 2024-05-21 | Stop reason: HOSPADM

## 2024-05-18 RX ORDER — MAGNESIUM SULFATE HEPTAHYDRATE 40 MG/ML
2 INJECTION, SOLUTION INTRAVENOUS ONCE
Status: DISCONTINUED | OUTPATIENT
Start: 2024-05-18 | End: 2024-05-18

## 2024-05-18 RX ORDER — INSULIN LISPRO 100 [IU]/ML
7 INJECTION, SOLUTION INTRAVENOUS; SUBCUTANEOUS
Status: DISCONTINUED | OUTPATIENT
Start: 2024-05-19 | End: 2024-05-19

## 2024-05-18 RX ORDER — MUPIROCIN 20 MG/G
OINTMENT TOPICAL 2 TIMES DAILY
Status: DISCONTINUED | OUTPATIENT
Start: 2024-05-18 | End: 2024-05-21 | Stop reason: HOSPADM

## 2024-05-18 RX ORDER — HYDRALAZINE HYDROCHLORIDE 20 MG/ML
5 INJECTION INTRAMUSCULAR; INTRAVENOUS ONCE
Status: DISCONTINUED | OUTPATIENT
Start: 2024-05-18 | End: 2024-05-21 | Stop reason: HOSPADM

## 2024-05-18 RX ORDER — PANTOPRAZOLE SODIUM 40 MG/1
40 TABLET, DELAYED RELEASE ORAL DAILY
Status: DISCONTINUED | OUTPATIENT
Start: 2024-05-18 | End: 2024-05-21 | Stop reason: HOSPADM

## 2024-05-18 RX ORDER — FUROSEMIDE 10 MG/ML
40 INJECTION INTRAMUSCULAR; INTRAVENOUS ONCE
Status: COMPLETED | OUTPATIENT
Start: 2024-05-18 | End: 2024-05-18

## 2024-05-18 RX ORDER — INSULIN LISPRO 100 [IU]/ML
5 INJECTION, SOLUTION INTRAVENOUS; SUBCUTANEOUS
Status: DISCONTINUED | OUTPATIENT
Start: 2024-05-18 | End: 2024-05-18

## 2024-05-18 RX ORDER — INSULIN GLARGINE 100 [IU]/ML
80 INJECTION, SOLUTION SUBCUTANEOUS NIGHTLY
Status: DISCONTINUED | OUTPATIENT
Start: 2024-05-18 | End: 2024-05-19

## 2024-05-18 RX ORDER — VANCOMYCIN 1.5 G/300ML
1500 INJECTION, SOLUTION INTRAVENOUS EVERY 12 HOURS
Status: DISCONTINUED | OUTPATIENT
Start: 2024-05-18 | End: 2024-05-19

## 2024-05-18 RX ORDER — POTASSIUM CHLORIDE 14.9 MG/ML
20 INJECTION INTRAVENOUS ONCE
Status: COMPLETED | OUTPATIENT
Start: 2024-05-18 | End: 2024-05-18

## 2024-05-18 RX ADMIN — INSULIN LISPRO 9 UNITS: 100 INJECTION, SOLUTION INTRAVENOUS; SUBCUTANEOUS at 16:56

## 2024-05-18 RX ADMIN — INSULIN LISPRO 5 UNITS: 100 INJECTION, SOLUTION INTRAVENOUS; SUBCUTANEOUS at 08:36

## 2024-05-18 RX ADMIN — HEPARIN SODIUM 3100 UNITS/HR: 10000 INJECTION, SOLUTION INTRAVENOUS at 11:12

## 2024-05-18 RX ADMIN — PANTOPRAZOLE SODIUM 40 MG: 40 TABLET, DELAYED RELEASE ORAL at 09:52

## 2024-05-18 RX ADMIN — Medication 80 PERCENT: at 08:00

## 2024-05-18 RX ADMIN — ACETAMINOPHEN 650 MG: 325 TABLET ORAL at 08:43

## 2024-05-18 RX ADMIN — PIPERACILLIN SODIUM AND TAZOBACTAM SODIUM 4.5 G: 4; .5 INJECTION, SOLUTION INTRAVENOUS at 23:38

## 2024-05-18 RX ADMIN — INSULIN LISPRO 5 UNITS: 100 INJECTION, SOLUTION INTRAVENOUS; SUBCUTANEOUS at 16:58

## 2024-05-18 RX ADMIN — FUROSEMIDE 40 MG: 10 INJECTION, SOLUTION INTRAMUSCULAR; INTRAVENOUS at 09:00

## 2024-05-18 RX ADMIN — Medication 45 PERCENT: at 19:20

## 2024-05-18 RX ADMIN — VALSARTAN 40 MG: 80 TABLET, FILM COATED ORAL at 09:52

## 2024-05-18 RX ADMIN — HEPARIN SODIUM 3100 UNITS/HR: 10000 INJECTION, SOLUTION INTRAVENOUS at 23:36

## 2024-05-18 RX ADMIN — POTASSIUM CHLORIDE 20 MEQ: 200 INJECTION, SOLUTION INTRAVENOUS at 10:06

## 2024-05-18 RX ADMIN — MUPIROCIN: 20 OINTMENT TOPICAL at 21:54

## 2024-05-18 RX ADMIN — INSULIN LISPRO 9 UNITS: 100 INJECTION, SOLUTION INTRAVENOUS; SUBCUTANEOUS at 08:39

## 2024-05-18 RX ADMIN — HEPARIN SODIUM 3100 UNITS/HR: 10000 INJECTION, SOLUTION INTRAVENOUS at 00:50

## 2024-05-18 RX ADMIN — MAGNESIUM SULFATE HEPTAHYDRATE 1 G: 1 INJECTION, SOLUTION INTRAVENOUS at 11:58

## 2024-05-18 RX ADMIN — INSULIN GLARGINE 80 UNITS: 100 INJECTION, SOLUTION SUBCUTANEOUS at 21:54

## 2024-05-18 ASSESSMENT — COGNITIVE AND FUNCTIONAL STATUS - GENERAL
MOBILITY SCORE: 24
DAILY ACTIVITIY SCORE: 24

## 2024-05-18 ASSESSMENT — ENCOUNTER SYMPTOMS
MUSCULOSKELETAL NEGATIVE: 1
HEMATOLOGIC/LYMPHATIC NEGATIVE: 1
ENDOCRINE NEGATIVE: 1
GASTROINTESTINAL NEGATIVE: 1
CONSTITUTIONAL NEGATIVE: 1
EYES NEGATIVE: 1
RESPIRATORY NEGATIVE: 1
PSYCHIATRIC NEGATIVE: 1
CARDIOVASCULAR NEGATIVE: 1
ALLERGIC/IMMUNOLOGIC NEGATIVE: 1
NEUROLOGICAL NEGATIVE: 1

## 2024-05-18 ASSESSMENT — PAIN SCALES - GENERAL
PAINLEVEL_OUTOF10: 0 - NO PAIN
PAINLEVEL_OUTOF10: 0 - NO PAIN

## 2024-05-18 ASSESSMENT — PAIN - FUNCTIONAL ASSESSMENT: PAIN_FUNCTIONAL_ASSESSMENT: 0-10

## 2024-05-18 NOTE — CARE PLAN
The patient's goals for the shift include  Feel better    The clinical goals for the shift include stability    Over the shift, the patient did not make progress toward the following goals. Barriers to progression include respiratory status. Recommendations to address these barriers include lasix, maintain O2 sat, replace K and mag.

## 2024-05-18 NOTE — PROGRESS NOTES
"Mina Huitron is a 53 y.o. male on day 4 of admission presenting with Acute pulmonary embolism, unspecified pulmonary embolism type, unspecified whether acute cor pulmonale present (Multi).    Subjective   Patient seen bedside this morning.  Not feeling well.  Feeling nauseous with subjective chills.         Physical Exam    Objective     Objective: Patient seen bedside. 460-A.  Aware alert and oriented x 3.        Vasc: DP and PT pulses are faintly palpable bilateral.  Remarkable +2 pitting edema to bilateral lower extremities.  There is evidence of chronic hemosiderin deposition secondary to venous insufficiency.  Skin temperature is warm to cool proximal to distal bilateral.       Neuro:  Light touch is intact to the foot bilateral.  Protective sensation is completely absent to the foot when tested with the 5.07 SWM bilateral.       Derm: Right foot plantar central midfoot with large roughly 3.0 cm x 1.6 cm x 0.6 cm ulceration.  Fibrogranular base.  Significant hyperkeratotic buildup.  No Malodor.  There is some discernible depth and undermining as well as probing although not to the level of bone.  No purulent drainage.  No abscess formation.     Ortho: Charcot neuroarthropathy with midfoot breakdown and rocker-bottom foot deformity to the right foot.  Loss of fourth digit right foot.       Last Recorded Vitals  Blood pressure 130/59, pulse (!) 111, temperature 37 °C (98.6 °F), temperature source Oral, resp. rate 16, height 1.854 m (6' 1\"), weight 135 kg (298 lb 1 oz), SpO2 96%.    Intake/Output last 3 Shifts:  I/O last 3 completed shifts:  In: 593.3 (4.4 mL/kg) [P.O.:150; I.V.:443.3 (3.3 mL/kg)]  Out: - (0 mL/kg)   Weight: 135.2 kg     Relevant Results           Assessment/Plan   Principal Problem:    Acute pulmonary embolism, unspecified pulmonary embolism type, unspecified whether acute cor pulmonale present (Multi)    #chronic diabetic foot ulceration, right foot in the setting of Charcot " neuroarthropathy  -right foot wound culture resulting S. Aureus and Pasturella multocida. Recommending ID consult for antibiotic recommendations.      Radiographs without suggestion of worsening charcot neuroarthropathy or evidence of new erosive changes.     Dressing changes with betadine, calcium alginate ag, DSD.     Will continue to follow this admission     I spent 30 minutes in the professional and overall care of this patient.          Mina Fischer DPM

## 2024-05-18 NOTE — SIGNIFICANT EVENT
Called by RT to assess pt. 82% on 6L, tachycardiac and hypertensive. Upon arrival SBP over 200. Iteam called as orders for medications were needed STAT. IV meds ordered as pt had episode of emesis prior to Iteam. Pt placed on HFNC with improvement in condition. See flowsheet and MAR for interventions

## 2024-05-18 NOTE — SIGNIFICANT EVENT
I team called for nausea, hypoxia, hypertension and chills.    When I-team called, SaO2 82 % and SBP 82 on 6 lpm via NC. Respiratory therapy placed patient on high flow. Patient states he felt nauseated when he had an episode of chills, but feels better now that he vomited and chills are improved.     Ordered lasix to be given IV instead of PO and one-time dose of IV hydralazine for hypertension. Resume remainder of oral medications as tolerated. Hold oral lasix today.     Vital signs - , , SaO2 100% on high flow NC of 70% prior to IV hydralazine. Telemetry shows sinus tachycardia.     With IV lasix, will give 20 mEq IV potassium and 1 GM IV magnesium.     Discussed with Dr. June at bedside.

## 2024-05-18 NOTE — NURSING NOTE
05/18/2024  [id number]: Pt  observed with sudden chills, tachycardia at 110, BP of 178/102 respiration of 24 breaths/min  and oxygen saturation of 88% Temps 97.7F. RN intervention performed includes checking BG, increasing oxygen to  6 liters, Attending MD Cuadra, notified via answering service no call back received. Rapid response RN Madhuri at bedside after being notified, Pt's condition improved  5 minutes later, VS rechecked, BP is 158/84, , Respiration 22 breaths/min and oxygen saturation at 94%. Pt monitored until shift change and remained hemodynamic stable.

## 2024-05-18 NOTE — SIGNIFICANT EVENT
RT called for pt with spo2 of 82% on 6L. Iteam called. Pt placed on HFNC 70% 30LPM per Dr Carney. Titrate as tolerated

## 2024-05-18 NOTE — NURSING NOTE
05/17/2024:  2112: Pt's Glucose level critical at 439, MD Cuadra notified via telephone, No new order given ordered lantus and humulin given with no issues  2350: Glucose level rechecked and 385 realized, MD Cuadra notified again, via Telephone, MD stated that he will address it in the morning.

## 2024-05-18 NOTE — CONSULTS
Consults    Reason For Consult  Acute bilateral pulmonary emboli acute hypoxic respiratory failure     History Of Present Illness  Mina Huitron is a 53 y.o. male presented to [facility] Emergency Department 5/14 for evaluation of worsening shortness of breath the past few days.  He was hypoxic upon arrival; 87% on room air and placed on 2 L nasal cannula oxygen to maintain O2 sats greater than or equal to 92%.  CT angio was obtained and showed extensive bilateral pulmonary emboli subsegmental branches bilaterally without evidence of right heart strain.  He started on a heparin drip and admitted to the ICU for further evaluation and treatment.     Past Medical History  History of DVT, pulmonary embolism  Diabetes mellitus type 2  Hypertension  Hyperlipidemia  Anemia of chronic disease  Neck foot ulceration  Morbid obesity    Surgical History  History reviewed. No pertinent surgical history.     Social History  Social History     Tobacco Use    Smoking status: Never     Passive exposure: Never    Smokeless tobacco: Never   Vaping Use    Vaping status: Never Used   Substance Use Topics    Alcohol use: Never    Drug use: Never       Family History  No family history on file.     Allergies  Patient has no known allergies.    Review of Systems   Constitutional: Negative.    HENT: Negative.     Eyes: Negative.    Respiratory: Negative.     Cardiovascular: Negative.    Gastrointestinal: Negative.    Endocrine: Negative.    Genitourinary: Negative.    Musculoskeletal: Negative.    Allergic/Immunologic: Negative.    Neurological: Negative.    Hematological: Negative.    Psychiatric/Behavioral: Negative.          Physical Exam  Vitals and nursing note reviewed.   Constitutional:       Appearance: Normal appearance.   HENT:      Head: Normocephalic and atraumatic.      Nose: Nose normal.      Mouth/Throat:      Mouth: Mucous membranes are moist.   Eyes:      Extraocular Movements: Extraocular movements intact.  "     Conjunctiva/sclera: Conjunctivae normal.      Pupils: Pupils are equal, round, and reactive to light.   Cardiovascular:      Rate and Rhythm: Regular rhythm. Tachycardia present.      Pulses: Normal pulses.      Heart sounds: Normal heart sounds.   Pulmonary:      Effort: Pulmonary effort is normal.      Comments: Lungs diminished but clear.  Abdominal:      General: Bowel sounds are normal.      Palpations: Abdomen is soft.   Musculoskeletal:         General: Normal range of motion.      Right lower leg: Edema present.      Left lower leg: Edema present.   Skin:     General: Skin is warm and dry.      Capillary Refill: Capillary refill takes less than 2 seconds.   Neurological:      General: No focal deficit present.      Mental Status: He is alert and oriented to person, place, and time.   Psychiatric:         Mood and Affect: Mood normal.         Behavior: Behavior normal.          Vital Signs  Blood pressure 130/59, pulse (!) 111, temperature 37 °C (98.6 °F), temperature source Oral, resp. rate 16, height 1.854 m (6' 1\"), weight 135 kg (298 lb 1 oz), SpO2 96%.  Oxygen Therapy  SpO2: 96 %  Medical Gas Therapy: Supplemental oxygen  O2 Delivery Method: High flow nasal cannula  FiO2 (%): 65 % (25LPM)      Intake/Output Summary (Last 24 hours) at 5/18/2024 1314  Last data filed at 5/18/2024 1033  Gross per 24 hour   Intake 819.98 ml   Output --   Net 819.98 ml    [Held by provider] furosemide, 40 mg, oral, Daily  hydrALAZINE, 5 mg, intravenous, Once  insulin glargine, 60 Units, subcutaneous, Nightly  insulin lispro, 0-15 Units, subcutaneous, TID  insulin lispro, 5 Units, subcutaneous, TID  oxygen, , inhalation, Continuous - Inhalation  pantoprazole, 40 mg, oral, Daily  valsartan, 40 mg, oral, Daily       PRN medications: acetaminophen, ammonium lactate, carvedilol, dextrose, dextrose, glucagon, glucagon, heparin (porcine)   heparin, 0-4,500 Units/hr, Last Rate: 3,100 Units/hr (05/18/24 1112)       Relevant " Results  Results for orders placed or performed during the hospital encounter of 05/14/24 (from the past 24 hour(s))   aPTT   Result Value Ref Range    aPTT 31.9 22.0 - 32.5 seconds   POCT GLUCOSE   Result Value Ref Range    POCT Glucose 294 (H) 74 - 99 mg/dL   Blood Gas Arterial Full Panel   Result Value Ref Range    POCT pH, Arterial 7.49 (H) 7.38 - 7.42 pH    POCT pCO2, Arterial 37 (L) 38 - 42 mm Hg    POCT pO2, Arterial 62 (L) 85 - 95 mm Hg    POCT SO2, Arterial 94 94 - 100 %    POCT Oxy Hemoglobin, Arterial 92.1 (L) 94.0 - 98.0 %    POCT Hematocrit Calculated, Arterial 43.0 41.0 - 52.0 %    POCT Sodium, Arterial 132 (L) 136 - 145 mmol/L    POCT Potassium, Arterial 3.6 3.5 - 5.3 mmol/L    POCT Chloride, Arterial 101 98 - 107 mmol/L    POCT Ionized Calcium, Arterial 1.21 1.10 - 1.33 mmol/L    POCT Glucose, Arterial 366 (H) 74 - 99 mg/dL    POCT Lactate, Arterial 1.6 0.4 - 2.0 mmol/L    POCT Base Excess, Arterial 4.7 (H) -2.0 - 3.0 mmol/L    POCT HCO3 Calculated, Arterial 28.2 (H) 22.0 - 26.0 mmol/L    POCT Hemoglobin, Arterial 14.4 13.5 - 17.5 g/dL    POCT Anion Gap, Arterial 6 (L) 10 - 25 mmo/L    Patient Temperature 37.0 degrees Celsius    FiO2 24 %    Site of Arterial Puncture Radial Right     Stefan's Test Positive    Troponin T   Result Value Ref Range    Troponin T, High Sensitivity 15 (HH) <=14 ng/L   POCT GLUCOSE   Result Value Ref Range    POCT Glucose 439 (H) 74 - 99 mg/dL   aPTT   Result Value Ref Range    aPTT 53.8 (H) 22.0 - 32.5 seconds   POCT GLUCOSE   Result Value Ref Range    POCT Glucose 385 (H) 74 - 99 mg/dL   CBC and Auto Differential   Result Value Ref Range    WBC 10.2 4.4 - 11.3 x10*3/uL    nRBC 0.0 0.0 - 0.0 /100 WBCs    RBC 5.20 4.50 - 5.90 x10*6/uL    Hemoglobin 14.9 13.5 - 17.5 g/dL    Hematocrit 45.5 41.0 - 52.0 %    MCV 88 80 - 100 fL    MCH 28.7 26.0 - 34.0 pg    MCHC 32.7 32.0 - 36.0 g/dL    RDW 14.4 11.5 - 14.5 %    Platelets 259 150 - 450 x10*3/uL    Neutrophils % 67.1 40.0 - 80.0  %    Immature Granulocytes %, Automated 0.7 0.0 - 0.9 %    Lymphocytes % 22.3 13.0 - 44.0 %    Monocytes % 7.6 2.0 - 10.0 %    Eosinophils % 1.9 0.0 - 6.0 %    Basophils % 0.4 0.0 - 2.0 %    Neutrophils Absolute 6.85 1.20 - 7.70 x10*3/uL    Immature Granulocytes Absolute, Automated 0.07 0.00 - 0.70 x10*3/uL    Lymphocytes Absolute 2.28 1.20 - 4.80 x10*3/uL    Monocytes Absolute 0.78 0.10 - 1.00 x10*3/uL    Eosinophils Absolute 0.19 0.00 - 0.70 x10*3/uL    Basophils Absolute 0.04 0.00 - 0.10 x10*3/uL   Basic Metabolic Panel   Result Value Ref Range    Glucose 275 (H) 65 - 99 mg/dL    Sodium 136 133 - 145 mmol/L    Potassium 3.8 3.4 - 5.1 mmol/L    Chloride 98 97 - 107 mmol/L    Bicarbonate 26 24 - 31 mmol/L    Urea Nitrogen 17 8 - 25 mg/dL    Creatinine 1.00 0.40 - 1.60 mg/dL    eGFR 90 >60 mL/min/1.73m*2    Calcium 9.5 8.5 - 10.4 mg/dL    Anion Gap 12 <=19 mmol/L   Magnesium   Result Value Ref Range    Magnesium 1.70 1.60 - 3.10 mg/dL   Phosphorus   Result Value Ref Range    Phosphorus 3.8 2.5 - 4.5 mg/dL   Light Blue Top   Result Value Ref Range    Extra Tube Hold for add-ons.    aPTT   Result Value Ref Range    aPTT 68.3 (H) 22.0 - 32.5 seconds   POCT GLUCOSE   Result Value Ref Range    POCT Glucose 286 (H) 74 - 99 mg/dL   aPTT   Result Value Ref Range    aPTT 48.2 (H) 22.0 - 32.5 seconds   POCT GLUCOSE   Result Value Ref Range    POCT Glucose 286 (H) 74 - 99 mg/dL   POCT GLUCOSE   Result Value Ref Range    POCT Glucose 273 (H) 74 - 99 mg/dL      XR chest 1 view  Result Date: 5/17/2024  FINDINGS: The cardiac size is within normal limits. The lungs are clear. No pleural abnormality is seen. There are mild degenerative changes of the thoracic spine.  No acute cardiopulmonary disease.      Transthoracic Echo (TTE) Complete  Addendum Date: 5/16/2024    RV is mild to moderately dilated and RV systolic function is mildly reduced.   CONCLUSIONS:  1. Left ventricular systolic function is normal with a 60% estimated  ejection fraction.  2. Spectral Doppler shows an impaired relaxation pattern of left ventricular diastolic filling.  3. Trace mitral valve regurgitation.  4. Physiological tricuspid regurgitation.    XR foot right 3+ views  Result Date: 5/16/2024  Interpreted By:  Raegan Larson, STUDY: XR FOOT RIGHT 3+ VIEWS 5/15/2024 5:22 pm   INDICATION: Signs/Symptoms:chronic right foot ulcer plantrar   COMPARISON: None available.   ACCESSION NUMBER(S): [id number]   ORDERING CLINICIAN: JAYLENE BANEGAS   TECHNIQUE: AP, lateral common oblique views of the right foot   FINDINGS: Hindfoot osseous structures demonstrate large calcaneal spurring. There is collapse of the talus with component of disorganized architecture and sequela of remote fracture and fragmentation. Of note, there is osseous fusion across the intertarsal articulations and sequela of Charcot arthropathy with fusion across the Lisfranc joint. There is persistent visualization of the calcaneocuboid articulation with eburnation noted as well as in the distribution of the talonavicular articulation. No acute fracture line within the visualized portions of the hindfoot within limits of this plain film. Forefoot metatarsal shafts demonstrate no evidence for fracture. There is postop operative resection 4th digit at the MTP joint. 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 Digits distally demonstrate no evidence for erosions.             1. Osseous fusion across the tarsal and tarsometatarsal articulations with sequela of Charcot arthropathy. Persistent visualization of the calcaneocuboid and talonavicular articulations   2. Skin ulceration plantar margin of the midfoot. However, no cortical erosion by plain film.   Signed by: Raegan Larson 5/16/2024 9:36 AM Dictation workstation:   [id number]    ECG 12 lead    Result Date: 5/15/2024  Sinus tachycardia Otherwise normal ECG No previous ECGs available Confirmed by Kole Durand ([id number]) on 5/15/2024 12:16:24 PM    Vascular US  lower extremity venous duplex bilateral    Result Date: 5/14/2024  Interpreted By:  Phil Tejeda, STUDY: [facility] LOWER EXTREMITY VENOUS DUPLEX BILATERAL  5/14/2024 5:10 pm   INDICATION: 52 y/o   M with  Signs/Symptoms:bilateral PEs. LMP:  Unknown.   COMPARISON: None.   ACCESSION NUMBER(S): [id number]   ORDERING CLINICIAN: NICKOLAS BOYKIN   TECHNIQUE: Routine ultrasound of the  bilateral lower extremities was performed with duplex Doppler (color and spectral) evaluation.   Static images were obtained for remote interpretation.     FINDINGS: Right lower extremity: All visualized deep veins in the right lower extremity are patent with no thrombosis. The veins are compressible and demonstrate normal augmentation.   Left lower extremity: The junction of the greater saphenous and common femoral veins is not compressible, which may be due to nonocclusive thrombosis. All other deep veins in the femoropopliteal segment of the left lower extremity are patent and compressible with no evidence of thrombosis. The left calf veins could not be identified.       Questionable nonocclusive thrombosis at the junction of the left greater saphenous vein and common femoral vein.   No DVT in the right lower extremity.   MACRO: None   Signed by: Phil Tejeda 5/14/2024 5:49 PM Dictation workstation:   [id number]    CT angio chest for pulmonary embolism    Result Date: 5/14/2024  Interpreted By:  Cameron Navarro, STUDY: CT ANGIO CHEST FOR PULMONARY EMBOLISM; 5/14/2024 11:16 am   INDICATION: Signs/Symptoms:short of breath, tachycardic, history of PE.   COMPARISON: None   ACCESSION NUMBER(S): [id number]   ORDERING CLINICIAN: NICKOLAS BOYKIN   TECHNIQUE: Contiguous axial images of the thorax were obtained from the level of the thoracic inlet through the lung bases. 3-D MIPS were created, processed and reviewed on a separate workstation. 100 ml of Omnipaque 350 was utilized. All CT examinations are performed with 1 or more of the  following dose reduction techniques: Automated exposure control, adjustment of mA and/or kv according to patient's size, or use of iterative reconstruction techniques.   FINDINGS: The thyroid gland is unremarkable.   There is adequate contrast opacification of the pulmonary arterial vasculature. Extensive filling defects consistent with pulmonary emboli are seen bilaterally. There is prominent pulmonary embolus at the distal main right and distal main left pulmonary emboli with extensive thrombus extending throughout the majority of segmental and subsegmental branches bilaterally. However, no evidence for right heart strain or significant septal deviation.   The heart size is within normal limits.  No pericardial effusion is identified. The aorta and pulmonary arteries are normal in caliber.   There are no pathologically enlarged mediastinal, hilar, or axillary lymph nodes are seen.   The trachea and mainstem bronchi are patent. No suspicious pulmonary nodules are seen. No significant consolidation. There is no evidence of pneumothorax or pleural effusion.   The visualized osseous structures are intact.   Limited images through the upper abdomen are unremarkable.       1. (+) PE.  Extensive bilateral pulmonary emboli. No evidence of right heart strain. 2. No significant airspace opacity or consolidation.      Impression  Acute hypoxic respiratory failure  Bilateral pulmonary emboli  Right chronic foot ulceration  Morbid obesity    Plan  Wean oxygen as sats allow  Continuous pulse oximetry  Incentive spirometry/pulmonary hygiene  Continue heparin drip  40 mg IV Lasix given x 1 dose today  Heme-Onc following  Podiatry following  Prophylaxis  PT/OT/out of bed  Reviewed with collaborating physician Dr. Hutchinson  Thank you for this consultation      MJ Ordaz-CNP  [facility]

## 2024-05-18 NOTE — NURSING NOTE
Pt complaining of SOB, nausea and chills.  Rapid response and RT called to bedside.  O2 sat decreasing to 83 %.  See vital signs.

## 2024-05-19 LAB
ANION GAP SERPL CALC-SCNC: 14 MMOL/L
APTT PPP: 38.4 SECONDS (ref 22–32.5)
APTT PPP: 55.8 SECONDS (ref 22–32.5)
B-LACTAMASE ORGANISM ISLT: POSITIVE
BACTERIA SPEC CULT: ABNORMAL
BASOPHILS # BLD AUTO: 0.03 X10*3/UL (ref 0–0.1)
BASOPHILS NFR BLD AUTO: 0.4 %
BUN SERPL-MCNC: 24 MG/DL (ref 8–25)
CALCIUM SERPL-MCNC: 8.8 MG/DL (ref 8.5–10.4)
CHLORIDE SERPL-SCNC: 94 MMOL/L (ref 97–107)
CO2 SERPL-SCNC: 23 MMOL/L (ref 24–31)
CREAT SERPL-MCNC: 1.4 MG/DL (ref 0.4–1.6)
EGFRCR SERPLBLD CKD-EPI 2021: 60 ML/MIN/1.73M*2
EOSINOPHIL # BLD AUTO: 0.03 X10*3/UL (ref 0–0.7)
EOSINOPHIL NFR BLD AUTO: 0.4 %
ERYTHROCYTE [DISTWIDTH] IN BLOOD BY AUTOMATED COUNT: 15.4 % (ref 11.5–14.5)
GLUCOSE BLD MANUAL STRIP-MCNC: 254 MG/DL (ref 74–99)
GLUCOSE BLD MANUAL STRIP-MCNC: 264 MG/DL (ref 74–99)
GLUCOSE BLD MANUAL STRIP-MCNC: 296 MG/DL (ref 74–99)
GLUCOSE BLD MANUAL STRIP-MCNC: 362 MG/DL (ref 74–99)
GLUCOSE SERPL-MCNC: 315 MG/DL (ref 65–99)
GRAM STN SPEC: ABNORMAL
GRAM STN SPEC: ABNORMAL
HCT VFR BLD AUTO: 41 % (ref 41–52)
HGB BLD-MCNC: 13.2 G/DL (ref 13.5–17.5)
IMM GRANULOCYTES # BLD AUTO: 0.04 X10*3/UL (ref 0–0.7)
IMM GRANULOCYTES NFR BLD AUTO: 0.5 % (ref 0–0.9)
LYMPHOCYTES # BLD AUTO: 0.75 X10*3/UL (ref 1.2–4.8)
LYMPHOCYTES NFR BLD AUTO: 9.6 %
MAGNESIUM SERPL-MCNC: 1.7 MG/DL (ref 1.6–3.1)
MCH RBC QN AUTO: 28.8 PG (ref 26–34)
MCHC RBC AUTO-ENTMCNC: 32.2 G/DL (ref 32–36)
MCV RBC AUTO: 90 FL (ref 80–100)
MONOCYTES # BLD AUTO: 0.47 X10*3/UL (ref 0.1–1)
MONOCYTES NFR BLD AUTO: 6 %
NEUTROPHILS # BLD AUTO: 6.5 X10*3/UL (ref 1.2–7.7)
NEUTROPHILS NFR BLD AUTO: 83.1 %
NRBC BLD-RTO: 0 /100 WBCS (ref 0–0)
PHOSPHATE SERPL-MCNC: 3.7 MG/DL (ref 2.5–4.5)
PLATELET # BLD AUTO: 177 X10*3/UL (ref 150–450)
POTASSIUM SERPL-SCNC: 4.2 MMOL/L (ref 3.4–5.1)
RBC # BLD AUTO: 4.58 X10*6/UL (ref 4.5–5.9)
SODIUM SERPL-SCNC: 131 MMOL/L (ref 133–145)
VANCOMYCIN SERPL-MCNC: 15.3 UG/ML (ref 10–20)
WBC # BLD AUTO: 7.8 X10*3/UL (ref 4.4–11.3)

## 2024-05-19 PROCEDURE — 85025 COMPLETE CBC W/AUTO DIFF WBC: CPT | Performed by: INTERNAL MEDICINE

## 2024-05-19 PROCEDURE — 84100 ASSAY OF PHOSPHORUS: CPT | Performed by: INTERNAL MEDICINE

## 2024-05-19 PROCEDURE — 80048 BASIC METABOLIC PNL TOTAL CA: CPT | Performed by: INTERNAL MEDICINE

## 2024-05-19 PROCEDURE — 94660 CPAP INITIATION&MGMT: CPT

## 2024-05-19 PROCEDURE — 2500000004 HC RX 250 GENERAL PHARMACY W/ HCPCS (ALT 636 FOR OP/ED): Performed by: INTERNAL MEDICINE

## 2024-05-19 PROCEDURE — 82947 ASSAY GLUCOSE BLOOD QUANT: CPT

## 2024-05-19 PROCEDURE — 85730 THROMBOPLASTIN TIME PARTIAL: CPT | Performed by: INTERNAL MEDICINE

## 2024-05-19 PROCEDURE — 2500000002 HC RX 250 W HCPCS SELF ADMINISTERED DRUGS (ALT 637 FOR MEDICARE OP, ALT 636 FOR OP/ED): Performed by: INTERNAL MEDICINE

## 2024-05-19 PROCEDURE — 2500000006 HC RX 250 W HCPCS SELF ADMINISTERED DRUGS (ALT 637 FOR ALL PAYERS): Performed by: INTERNAL MEDICINE

## 2024-05-19 PROCEDURE — 36415 COLL VENOUS BLD VENIPUNCTURE: CPT | Performed by: INTERNAL MEDICINE

## 2024-05-19 PROCEDURE — 2500000001 HC RX 250 WO HCPCS SELF ADMINISTERED DRUGS (ALT 637 FOR MEDICARE OP): Performed by: NURSE PRACTITIONER

## 2024-05-19 PROCEDURE — 1200000002 HC GENERAL ROOM WITH TELEMETRY DAILY

## 2024-05-19 PROCEDURE — 9420000001 HC RT PATIENT EDUCATION 5 MIN

## 2024-05-19 PROCEDURE — 2500000001 HC RX 250 WO HCPCS SELF ADMINISTERED DRUGS (ALT 637 FOR MEDICARE OP): Performed by: INTERNAL MEDICINE

## 2024-05-19 PROCEDURE — 94760 N-INVAS EAR/PLS OXIMETRY 1: CPT

## 2024-05-19 PROCEDURE — 83735 ASSAY OF MAGNESIUM: CPT | Performed by: INTERNAL MEDICINE

## 2024-05-19 PROCEDURE — 2500000004 HC RX 250 GENERAL PHARMACY W/ HCPCS (ALT 636 FOR OP/ED): Mod: JZ

## 2024-05-19 PROCEDURE — 2500000005 HC RX 250 GENERAL PHARMACY W/O HCPCS: Performed by: INTERNAL MEDICINE

## 2024-05-19 PROCEDURE — 80202 ASSAY OF VANCOMYCIN: CPT

## 2024-05-19 RX ORDER — DOXYCYCLINE 100 MG/1
100 CAPSULE ORAL EVERY 12 HOURS SCHEDULED
Status: DISCONTINUED | OUTPATIENT
Start: 2024-05-19 | End: 2024-05-21 | Stop reason: HOSPADM

## 2024-05-19 RX ORDER — VANCOMYCIN 1 G/200ML
1 INJECTION, SOLUTION INTRAVENOUS EVERY 12 HOURS
Status: DISCONTINUED | OUTPATIENT
Start: 2024-05-19 | End: 2024-05-19

## 2024-05-19 RX ORDER — INSULIN LISPRO 100 [IU]/ML
12 INJECTION, SOLUTION INTRAVENOUS; SUBCUTANEOUS
Status: DISCONTINUED | OUTPATIENT
Start: 2024-05-19 | End: 2024-05-21 | Stop reason: HOSPADM

## 2024-05-19 RX ORDER — INSULIN GLARGINE 100 [IU]/ML
60 INJECTION, SOLUTION SUBCUTANEOUS 2 TIMES DAILY
Status: DISCONTINUED | OUTPATIENT
Start: 2024-05-19 | End: 2024-05-21 | Stop reason: HOSPADM

## 2024-05-19 RX ADMIN — INSULIN LISPRO 9 UNITS: 100 INJECTION, SOLUTION INTRAVENOUS; SUBCUTANEOUS at 08:56

## 2024-05-19 RX ADMIN — INSULIN LISPRO 12 UNITS: 100 INJECTION, SOLUTION INTRAVENOUS; SUBCUTANEOUS at 16:30

## 2024-05-19 RX ADMIN — INSULIN LISPRO 9 UNITS: 100 INJECTION, SOLUTION INTRAVENOUS; SUBCUTANEOUS at 16:29

## 2024-05-19 RX ADMIN — MUPIROCIN: 20 OINTMENT TOPICAL at 20:42

## 2024-05-19 RX ADMIN — MUPIROCIN: 20 OINTMENT TOPICAL at 08:53

## 2024-05-19 RX ADMIN — VANCOMYCIN 1 G: 1 INJECTION, SOLUTION INTRAVENOUS at 09:00

## 2024-05-19 RX ADMIN — INSULIN LISPRO 15 UNITS: 100 INJECTION, SOLUTION INTRAVENOUS; SUBCUTANEOUS at 12:01

## 2024-05-19 RX ADMIN — Medication 20 L/MIN: at 08:00

## 2024-05-19 RX ADMIN — HEPARIN SODIUM 3400 UNITS/HR: 10000 INJECTION, SOLUTION INTRAVENOUS at 10:59

## 2024-05-19 RX ADMIN — INSULIN GLARGINE 60 UNITS: 100 INJECTION, SOLUTION SUBCUTANEOUS at 16:00

## 2024-05-19 RX ADMIN — Medication 4 L/MIN: at 20:00

## 2024-05-19 RX ADMIN — PIPERACILLIN SODIUM AND TAZOBACTAM SODIUM 4.5 G: 4; .5 INJECTION, SOLUTION INTRAVENOUS at 12:00

## 2024-05-19 RX ADMIN — HEPARIN SODIUM 3400 UNITS/HR: 10000 INJECTION, SOLUTION INTRAVENOUS at 20:29

## 2024-05-19 RX ADMIN — VALSARTAN 40 MG: 80 TABLET, FILM COATED ORAL at 08:53

## 2024-05-19 RX ADMIN — VANCOMYCIN 1.5 G: 1.5 INJECTION, SOLUTION INTRAVENOUS at 00:42

## 2024-05-19 RX ADMIN — INSULIN LISPRO 7 UNITS: 100 INJECTION, SOLUTION INTRAVENOUS; SUBCUTANEOUS at 08:58

## 2024-05-19 RX ADMIN — PIPERACILLIN SODIUM AND TAZOBACTAM SODIUM 4.5 G: 4; .5 INJECTION, SOLUTION INTRAVENOUS at 06:37

## 2024-05-19 RX ADMIN — PIPERACILLIN SODIUM AND TAZOBACTAM SODIUM 4.5 G: 4; .5 INJECTION, SOLUTION INTRAVENOUS at 17:00

## 2024-05-19 RX ADMIN — DOXYCYCLINE HYCLATE 100 MG: 100 CAPSULE ORAL at 20:43

## 2024-05-19 RX ADMIN — PANTOPRAZOLE SODIUM 40 MG: 40 TABLET, DELAYED RELEASE ORAL at 08:53

## 2024-05-19 RX ADMIN — HEPARIN SODIUM 3400 UNITS/HR: 10000 INJECTION, SOLUTION INTRAVENOUS at 05:45

## 2024-05-19 ASSESSMENT — ENCOUNTER SYMPTOMS
SHORTNESS OF BREATH: 1
CHILLS: 0
COUGH: 0
WOUND: 1
FEVER: 0

## 2024-05-19 ASSESSMENT — PAIN - FUNCTIONAL ASSESSMENT: PAIN_FUNCTIONAL_ASSESSMENT: 0-10

## 2024-05-19 ASSESSMENT — PAIN SCALES - GENERAL: PAINLEVEL_OUTOF10: 0 - NO PAIN

## 2024-05-19 NOTE — CARE PLAN
The patient's goals for the shift include  Wean off Oxygen    The clinical goals for the shift include manage PE and respiratory issues    Over the shift, the patient did not make progress toward the following goals. Barriers to progression include none. Recommendations to address these barriers include continue to wean pt of O2.

## 2024-05-19 NOTE — PROGRESS NOTES
"Vancomycin Dosing by Pharmacy- FOLLOW UP    Mina Huitron is a 53 y.o. year old male who Pharmacy has been consulted for vancomycin dosing for cellulitis, skin and soft tissue. Based on the patient's indication and renal status this patient is being dosed based on a goal AUC of 400-600.     Renal function is currently declining.    Current vancomycin dose: 1500 mg given every 12 hours    Estimated vancomycin AUC on current dose: 699 mg/L.hr     Visit Vitals  /67 (BP Location: Left arm, Patient Position: Sitting)   Pulse 101   Temp 37.4 °C (99.3 °F) (Temporal)   Resp 18        Lab Results   Component Value Date    CREATININE 1.40 05/19/2024    CREATININE 1.00 05/18/2024    CREATININE 0.90 05/17/2024    CREATININE 0.90 05/16/2024        Patient weight is No results found for: \"PTWEIGHT\"    No results found for: \"CULTURE\"     I/O last 3 completed shifts:  In: 1700 (12.8 mL/kg) [P.O.:480; I.V.:820 (6.2 mL/kg); IV Piggyback:400]  Out: 680 (5.1 mL/kg) [Urine:680 (0.1 mL/kg/hr)]  Weight: 132.8 kg   [unfilled]    Lab Results   Component Value Date    PATIENTTEMP 37.0 05/17/2024        Assessment/Plan    Above goal AUC. Orders placed for new vancomcyin regimen of 1000mg every 12 hours to begin at 1000.    This dosing regimen is predicted by [employer] to result in the following pharmacokinetic parameters:    Loading dose: N/A  Regimen: 1000 mg IV every 12 hours.  Start time: 12:42 on 05/19/2024  Exposure target: AUC24 (range)400-600 mg/L.hr   AUC24,ss: 468 mg/L.hr  Probability of AUC24 > 400: 74 %  Ctrough,ss: 14.7 mg/L  Probability of Ctrough,ss > 20: 16 %  Probability of nephrotoxicity (Lodise MARJ 2009): 10 %    The next level will be obtained on 5/20 at 0500. May be obtained sooner if clinically indicated.   Will continue to monitor renal function daily while on vancomycin and order serum creatinine at least every 48 hours if not already ordered.  Follow for continued vancomycin needs, clinical response, and " signs/symptoms of toxicity.       Benita Daily, PharmD

## 2024-05-19 NOTE — PROGRESS NOTES
"Mina Huitron is a 53 y.o. male on day 5 of admission follow-up for acute hypoxia, acute pulmonary embolism    Subjective   4 L nasal cannula oxygen; O2 sats 95%.  No respiratory complaints.  Denies pain.  Febrile.       Objective     Physical Exam  Constitutional:       Appearance: He is obese.   Pulmonary:      Comments: Lungs diminished but clear.        Last Recorded Vitals  Blood pressure 157/66, pulse 106, temperature 37.1 °C (98.8 °F), temperature source Temporal, resp. rate 20, height 1.854 m (6' 1\"), weight 133 kg (292 lb 12.3 oz), SpO2 95%.  Intake/Output last 3 Shifts:  I/O last 3 completed shifts:  In: 1700 (12.8 mL/kg) [P.O.:480; I.V.:820 (6.2 mL/kg); IV Piggyback:400]  Out: 680 (5.1 mL/kg) [Urine:680 (0.1 mL/kg/hr)]  Weight: 132.8 kg     Scheduled medications  [Held by provider] furosemide, 40 mg, oral, Daily  hydrALAZINE, 5 mg, intravenous, Once  insulin glargine, 80 Units, subcutaneous, Nightly  insulin lispro, 0-15 Units, subcutaneous, TID  insulin lispro, 7 Units, subcutaneous, TID  mupirocin, , Topical, BID  oxygen, , inhalation, Continuous - Inhalation  pantoprazole, 40 mg, oral, Daily  piperacillin-tazobactam, 4.5 g, intravenous, q6h  valsartan, 40 mg, oral, Daily  vancomycin (Xellia) 1 g in 200 mL, 1 g, intravenous, q12h      Continuous medications  heparin, 0-4,500 Units/hr, Last Rate: 3,400 Units/hr (05/19/24 1059)      PRN medications  PRN medications: acetaminophen, ammonium lactate, carvedilol, dextrose, dextrose, glucagon, glucagon, heparin (porcine), vancomycin    Relevant Results  Results for orders placed or performed during the hospital encounter of 05/14/24 (from the past 24 hour(s))   Magnesium   Result Value Ref Range    Magnesium 2.00 1.60 - 3.10 mg/dL   POCT GLUCOSE   Result Value Ref Range    POCT Glucose 256 (H) 74 - 99 mg/dL   POCT GLUCOSE   Result Value Ref Range    POCT Glucose 284 (H) 74 - 99 mg/dL   aPTT   Result Value Ref Range    aPTT 38.4 (H) 22.0 - 32.5 seconds   CBC " and Auto Differential   Result Value Ref Range    WBC 7.8 4.4 - 11.3 x10*3/uL    nRBC 0.0 0.0 - 0.0 /100 WBCs    RBC 4.58 4.50 - 5.90 x10*6/uL    Hemoglobin 13.2 (L) 13.5 - 17.5 g/dL    Hematocrit 41.0 41.0 - 52.0 %    MCV 90 80 - 100 fL    MCH 28.8 26.0 - 34.0 pg    MCHC 32.2 32.0 - 36.0 g/dL    RDW 15.4 (H) 11.5 - 14.5 %    Platelets 177 150 - 450 x10*3/uL    Neutrophils % 83.1 40.0 - 80.0 %    Immature Granulocytes %, Automated 0.5 0.0 - 0.9 %    Lymphocytes % 9.6 13.0 - 44.0 %    Monocytes % 6.0 2.0 - 10.0 %    Eosinophils % 0.4 0.0 - 6.0 %    Basophils % 0.4 0.0 - 2.0 %    Neutrophils Absolute 6.50 1.20 - 7.70 x10*3/uL    Immature Granulocytes Absolute, Automated 0.04 0.00 - 0.70 x10*3/uL    Lymphocytes Absolute 0.75 (L) 1.20 - 4.80 x10*3/uL    Monocytes Absolute 0.47 0.10 - 1.00 x10*3/uL    Eosinophils Absolute 0.03 0.00 - 0.70 x10*3/uL    Basophils Absolute 0.03 0.00 - 0.10 x10*3/uL   Basic Metabolic Panel   Result Value Ref Range    Glucose 315 (H) 65 - 99 mg/dL    Sodium 131 (L) 133 - 145 mmol/L    Potassium 4.2 3.4 - 5.1 mmol/L    Chloride 94 (L) 97 - 107 mmol/L    Bicarbonate 23 (L) 24 - 31 mmol/L    Urea Nitrogen 24 8 - 25 mg/dL    Creatinine 1.40 0.40 - 1.60 mg/dL    eGFR 60 (L) >60 mL/min/1.73m*2    Calcium 8.8 8.5 - 10.4 mg/dL    Anion Gap 14 <=19 mmol/L   Magnesium   Result Value Ref Range    Magnesium 1.70 1.60 - 3.10 mg/dL   Phosphorus   Result Value Ref Range    Phosphorus 3.7 2.5 - 4.5 mg/dL   Vancomycin   Result Value Ref Range    Vancomycin 15.3 10.0 - 20.0 ug/mL   POCT GLUCOSE   Result Value Ref Range    POCT Glucose 296 (H) 74 - 99 mg/dL   APTT   Result Value Ref Range    aPTT 55.8 (H) 22.0 - 32.5 seconds   POCT GLUCOSE   Result Value Ref Range    POCT Glucose 362 (H) 74 - 99 mg/dL      XR chest 1 view    Result Date: 5/17/2024  Interpreted By:  Pattie Snyder, STUDY: XR CHEST 1 VIEW 5/17/2024 4:11 pm   INDICATION: Signs/Symptoms:Hypoxia   COMPARISON: None available.   ACCESSION NUMBER(S):  [id number]   ORDERING CLINICIAN: LUX DILL   TECHNIQUE: AP erect view of the chest   FINDINGS: The cardiac size is within normal limits. The lungs are clear. No pleural abnormality is seen. There are mild degenerative changes of the thoracic spine.       No acute cardiopulmonary disease.   Signed by: Pattie Snyder 5/17/2024 4:21 PM Dictation workstation:   [id number]    Transthoracic Echo (TTE) Complete  Addendum Date: 5/16/2024    RV is mild to moderately dilated and RV systolic function is mildly reduced. [id number] Ricardo Cortez MD Electronically Amended 5/16/2024,     Result Date: 5/16/2024           [place] [address]            Phone [phone] TRANSTHORACIC ECHOCARDIOGRAM REPORT  Patient Name:      JAYLENE Ortega Physician:    [id number] Ricardo Cortez MD Study Date:        5/15/2024             Ordering Provider:    [id number] ANDRADE MRN/PID:           [id number]              Fellow: Accession#:        [id number]          Nurse: Date of Birth/Age: 1970 / 53 years  Sonographer:          Tanner Delgadillo RDCS Gender:            M                     Additional Staff: Height:            182.00 cm             Admit Date: Weight:            139.00 kg             Admission Status:     Inpatient -                                                                Routine BSA / BMI:         2.55 m2 / 41.96 kg/m2 Department Location:  [facility] Blood Pressure: 152 /77 mmHg Study Type:    TRANSTHORACIC ECHO (TTE) COMPLETE Diagnosis/ICD: Chronic pulmonary embolism-I27.82 Indication:    PE with Edema CPT Codes:     Echo Complete w Full [id number] Patient History: Pertinent History: HTN, Hyperlipidemia, LE Edema, Dyspnea and Syncope. PE,                     Elevated Troponin. Study Detail: The following Echo studies were performed: 2D, M-Mode, Doppler and               color flow. Technically challenging study due to poor acoustic               windows. Definity used as a contrast agent for endocardial border               definition. Total contrast used for this procedure was 1 mL via IV               push.  PHYSICIAN INTERPRETATION: Left Ventricle: Left ventricular systolic function is normal, with an estimated ejection fraction of 60%. There are no regional wall motion abnormalities. The left ventricular cavity size is normal. Spectral Doppler shows an impaired relaxation pattern of left ventricular diastolic filling. Left Atrium: The left atrium is normal in size. Right Ventricle: The right ventricle is normal in size. There is normal right ventricular global systolic function. Right Atrium: The right atrium is normal in size. Aortic Valve: The aortic valve is trileaflet. There is no evidence of aortic valve regurgitation. The peak instantaneous gradient of the aortic valve is 4.4 mmHg. The mean gradient of the aortic valve is 2.0 mmHg. Mitral Valve: The mitral valve is normal in structure. There is trace mitral valve regurgitation. Tricuspid Valve: The tricuspid valve is structurally normal. There is physiological tricuspid regurgitation. The right ventricular systolic pressure is unable to be estimated. Pulmonic Valve: The pulmonic valve is not well visualized. The pulmonic valve regurgitation was not well visualized. Pericardium: There is no pericardial effusion noted. Aorta: The aortic root is normal.  CONCLUSIONS:  1. Left ventricular systolic function is normal with a 60% estimated ejection fraction.  2. Spectral Doppler shows an impaired relaxation pattern of left ventricular diastolic filling.  3. Trace mitral valve regurgitation.  4. Physiological tricuspid regurgitation. QUANTITATIVE DATA SUMMARY: 2D MEASUREMENTS:                          Normal Ranges:  LAs:           3.60 cm   (2.7-4.0cm) IVSd:          0.92 cm   (0.6-1.1cm) LVPWd:         0.79 cm   (0.6-1.1cm) LVIDd:         4.67 cm   (3.9-5.9cm) LVIDs:         3.98 cm LV Mass Index: 51.8 g/m2 LV % FS        14.8 % LA VOLUME:                               Normal Ranges: LA Vol A4C:        58.6 ml    (22+/-6mL/m2) LA Vol A2C:        62.2 ml LA Vol BP:         61.0 ml LA Vol Index A4C:  23.0ml/m2 LA Vol Index A2C:  24.5 ml/m2 LA Vol Index BP:   24.0 ml/m2 LA Area A4C:       19.3 cm2 LA Area A2C:       19.7 cm2 LA Major Axis A4C: 5.4 cm LA Major Axis A2C: 5.3 cm LA Volume Index:   24.0 ml/m2 LA Vol A4C:        55.0 ml LA Vol A2C:        60.0 ml LV SYSTOLIC FUNCTION BY 2D PLANIMETRY (MOD):                     Normal Ranges: EF-A4C View: 60.5 % (>=55%) EF-A2C View: 63.6 % EF-Biplane:  61.0 % LV DIASTOLIC FUNCTION:                        Normal Ranges: MV Peak E:    0.84 m/s (0.7-1.2 m/s) MV Peak A:    0.93 m/s (0.42-0.7 m/s) E/A Ratio:    0.90     (1.0-2.2) MV lateral e' 0.07 m/s MV medial e'  0.05 m/s MITRAL VALVE:                 Normal Ranges: MV DT: 205 msec (150-240msec) AORTIC VALVE:                                   Normal Ranges: AoV Vmax:                1.05 m/s (<=1.7m/s) AoV Peak P.4 mmHg (<20mmHg) AoV Mean P.0 mmHg (1.7-11.5mmHg) LVOT Max Robin:            0.95 m/s (<=1.1m/s) AoV VTI:                 17.50 cm (18-25cm) LVOT VTI:                14.90 cm LVOT Diameter:           2.00 cm  (1.8-2.4cm) AoV Area, VTI:           2.67 cm2 (2.5-5.5cm2) AoV Area,Vmax:           2.83 cm2 (2.5-4.5cm2) AoV Dimensionless Index: 0.85  RIGHT VENTRICLE: RV Basal 3.81 cm RV Mid   3.95 cm RV Major 8.3 cm TAPSE:   25.9 mm RV s'    0.09 m/s TRICUSPID VALVE/RVSP:                   Normal Ranges: IVC Diam: 2.09 cm PULMONIC VALVE:                      Normal Ranges: PV Max Robin: 0.8 m/s  (0.6-0.9m/s) PV Max P.4 mmHg  [id number] Ricardo Cortez MD Electronically signed on 2024 at 12:44:56 PM  **  Final **     XR foot right 3+ views    Result Date: 5/16/2024  Interpreted By:  Raegan Larson, STUDY: XR FOOT RIGHT 3+ VIEWS 5/15/2024 5:22 pm   INDICATION: Signs/Symptoms:chronic right foot ulcer plantrar   COMPARISON: None available.   ACCESSION NUMBER(S): [id number]   ORDERING CLINICIAN: JAYLENE BANEGAS   TECHNIQUE: AP, lateral common oblique views of the right foot   FINDINGS: Hindfoot osseous structures demonstrate large calcaneal spurring. There is collapse of the talus with component of disorganized architecture and sequela of remote fracture and fragmentation. Of note, there is osseous fusion across the intertarsal articulations and sequela of Charcot arthropathy with fusion across the Lisfranc joint. There is persistent visualization of the calcaneocuboid articulation with eburnation noted as well as in the distribution of the talonavicular articulation. No acute fracture line within the visualized portions of the hindfoot within limits of this plain film. Forefoot metatarsal shafts demonstrate no evidence for fracture. There is postop operative resection 4th digit at the MTP joint. 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 Digits distally demonstrate no evidence for erosions.             1. Osseous fusion across the tarsal and tarsometatarsal articulations with sequela of Charcot arthropathy. Persistent visualization of the calcaneocuboid and talonavicular articulations   2. Skin ulceration plantar margin of the midfoot. However, no cortical erosion by plain film.   Signed by: Raegan Larson 5/16/2024 9:36 AM Dictation workstation:   [id number]    ECG 12 lead    Result Date: 5/15/2024  Sinus tachycardia Otherwise normal ECG No previous ECGs available Confirmed by Kole Durand ([id number]) on 5/15/2024 12:16:24 PM    Vascular US lower extremity venous duplex bilateral    Result Date: 5/14/2024  Interpreted By:  Phil Tejeda, STUDY: VASC US LOWER EXTREMITY VENOUS DUPLEX BILATERAL  5/14/2024 5:10 pm   INDICATION:  54 y/o   M with  Signs/Symptoms:bilateral PEs. LMP:  Unknown.   COMPARISON: None.   ACCESSION NUMBER(S): [id number]   ORDERING CLINICIAN: NICKOLAS BOYKIN   TECHNIQUE: Routine ultrasound of the  bilateral lower extremities was performed with duplex Doppler (color and spectral) evaluation.   Static images were obtained for remote interpretation.     FINDINGS: Right lower extremity: All visualized deep veins in the right lower extremity are patent with no thrombosis. The veins are compressible and demonstrate normal augmentation.   Left lower extremity: The junction of the greater saphenous and common femoral veins is not compressible, which may be due to nonocclusive thrombosis. All other deep veins in the femoropopliteal segment of the left lower extremity are patent and compressible with no evidence of thrombosis. The left calf veins could not be identified.       Questionable nonocclusive thrombosis at the junction of the left greater saphenous vein and common femoral vein.   No DVT in the right lower extremity.   MACRO: None   Signed by: Phil Tejeda 5/14/2024 5:49 PM Dictation workstation:   [id number]    CT angio chest for pulmonary embolism    Result Date: 5/14/2024  Interpreted By:  Cameron Navarro, STUDY: CT ANGIO CHEST FOR PULMONARY EMBOLISM; 5/14/2024 11:16 am   INDICATION: Signs/Symptoms:short of breath, tachycardic, history of PE.   COMPARISON: None   ACCESSION NUMBER(S): [id number]   ORDERING CLINICIAN: NICKOLAS BOYKIN   TECHNIQUE: Contiguous axial images of the thorax were obtained from the level of the thoracic inlet through the lung bases. 3-D MIPS were created, processed and reviewed on a separate workstation. 100 ml of Omnipaque 350 was utilized. All CT examinations are performed with 1 or more of the following dose reduction techniques: Automated exposure control, adjustment of mA and/or kv according to patient's size, or use of iterative reconstruction techniques.   FINDINGS: The  thyroid gland is unremarkable.   There is adequate contrast opacification of the pulmonary arterial vasculature. Extensive filling defects consistent with pulmonary emboli are seen bilaterally. There is prominent pulmonary embolus at the distal main right and distal main left pulmonary emboli with extensive thrombus extending throughout the majority of segmental and subsegmental branches bilaterally. However, no evidence for right heart strain or significant septal deviation.   The heart size is within normal limits.  No pericardial effusion is identified. The aorta and pulmonary arteries are normal in caliber.   There are no pathologically enlarged mediastinal, hilar, or axillary lymph nodes are seen.   The trachea and mainstem bronchi are patent. No suspicious pulmonary nodules are seen. No significant consolidation. There is no evidence of pneumothorax or pleural effusion.   The visualized osseous structures are intact.   Limited images through the upper abdomen are unremarkable.       1. (+) PE.  Extensive bilateral pulmonary emboli. No evidence of right heart strain. 2. No significant airspace opacity or consolidation.         Impression  Acute hypoxic respiratory failure  Bilateral pulmonary emboli  Right chronic foot ulceration  Morbid obesity     Plan  Wean oxygen as sats allow  Continuous pulse oximetry  Incentive spirometry/pulmonary hygiene  Continue heparin drip  Heme-Onc following  Podiatry following  Prophylaxis  PT/OT/out of bed          Angela Swanson, APRN-CNP  [facility]

## 2024-05-19 NOTE — CONSULTS
Vancomycin Dosing by Pharmacy- Cessation of Therapy    Consult to pharmacy for vancomycin dosing has been discontinued by the prescriber, pharmacy will sign off at this time.    Please call pharmacy if there are further questions or re-enter a consult if vancomycin is resumed.     Vicente Rodriguez, SaraiD

## 2024-05-19 NOTE — CONSULTS
"Vancomycin Dosing by Pharmacy- INITIAL    Mina Huitron is a 53 y.o. year old male who Pharmacy has been consulted for vancomycin dosing for cellulitis, skin and soft tissue. Based on the patient's indication and renal status this patient will be dosed based on a goal AUC of 400-600.     Renal function is currently stable.    Visit Vitals  /66 (BP Location: Left arm, Patient Position: Sitting)   Pulse 101   Temp 37.2 °C (99 °F) (Oral)   Resp 16        Lab Results   Component Value Date    CREATININE 1.00 05/18/2024    CREATININE 0.90 05/17/2024    CREATININE 0.90 05/16/2024    CREATININE 0.80 05/15/2024        Patient weight is 135kg    No results found for: \"CULTURE\"     I/O last 3 completed shifts:  In: 1300 (9.6 mL/kg) [P.O.:480; I.V.:820 (6.1 mL/kg)]  Out: 150 (1.1 mL/kg) [Urine:150 (0 mL/kg/hr)]  Weight: 135.2 kg   [unfilled]    Lab Results   Component Value Date    PATIENTTEMP 37.0 05/17/2024          Assessment/Plan     Patient will not be given a loading dose.  Will initiate vancomycin maintenance,  1500 mg every 12 hours.    This dosing regimen is predicted by [employer] to result in the following pharmacokinetic parameters:  Loading dose: N/A  Regimen: 1500 mg IV every 12 hours.  Start time: 20:43 on 05/18/2024  Exposure target: AUC24 (range)400-600 mg/L.hr   AUC24,ss: 536 mg/L.hr  Probability of AUC24 > 400: 80 %  Ctrough,ss: 17.6 mg/L  Probability of Ctrough,ss > 20: 38 %  Probability of nephrotoxicity (Lodise MARJ 2009): 13 %      Follow-up level will be ordered on 5/19 at 0500 unless clinically indicated sooner.  Will continue to monitor renal function daily while on vancomycin and order serum creatinine at least every 48 hours if not already ordered.  Follow for continued vancomycin needs, clinical response, and signs/symptoms of toxicity.       Vicente Rodriguez, PharmD       "

## 2024-05-19 NOTE — PROGRESS NOTES
Mina Huitron is a 53 y.o. male on day 5 of admission presenting with Acute pulmonary embolism, unspecified pulmonary embolism type, unspecified whether acute cor pulmonale present (Multi).    Subjective   Patient was seen and examined.  Lying in the bed.  Comfortable.  Not in acute distress.  Patient denies any headache or dizziness.  No nausea or vomiting.       Objective     Physical Exam  HEENT:  Head externally atraumatic,  extraocular movements intact, oral mucosa moist  Neck:  Supple, no JVP, no palpable adenopathy or thyromegaly.  No carotid bruit.  Chest:  Clear to auscultation and resonant.  Heart:  Regular rate and rhythm, no murmur or gallop could be appreciated.  Abdomen:  Soft, nontender, bowel sounds present, normoactive, no palpable hepatosplenomegaly.  Extremities:  No edema, pulses present, no cyanosis or clubbing.  CNS:  Patient alert, oriented to time, place and person.    No new deficit.  Cranial nerves 2-12 grossly intact  Skin:  No active rash.  Musculoskeletal:  No  apparent joint swelling or erythema, range of movement normal.  Last Recorded Vitals  Heart Rate:  [101-106]   Temp:  [36.2 °C (97.2 °F)-37.4 °C (99.3 °F)]   Resp:  [16-20]   BP: (125-168)/(54-75)   Weight:  [133 kg (292 lb 12.3 oz)]   SpO2:  [93 %-99 %]     Intake/Output last 3 Shifts:  I/O last 3 completed shifts:  In: 1700 (12.8 mL/kg) [P.O.:480; I.V.:820 (6.2 mL/kg); IV Piggyback:400]  Out: 680 (5.1 mL/kg) [Urine:680 (0.1 mL/kg/hr)]  Weight: 132.8 kg     Relevant Results  Susceptibility data from last 90 days.  Collected Specimen Info Organism Clindamycin Erythromycin Oxacillin Tetracycline Trimethoprim/Sulfamethoxazole Vancomycin   05/15/24 Tissue/Biopsy from Diabetic Foot Ulcer Methicillin Resistant Staphylococcus aureus (MRSA)  S  R  R  S  S  S     Pasteurella multocida           Mixed Anaerobic Bacteria           Mixed Gram-Positive Bacteria            Results for orders placed or performed during the hospital encounter  of 05/14/24 (from the past 24 hour(s))   Magnesium   Result Value Ref Range    Magnesium 2.00 1.60 - 3.10 mg/dL   POCT GLUCOSE   Result Value Ref Range    POCT Glucose 256 (H) 74 - 99 mg/dL   POCT GLUCOSE   Result Value Ref Range    POCT Glucose 284 (H) 74 - 99 mg/dL   aPTT   Result Value Ref Range    aPTT 38.4 (H) 22.0 - 32.5 seconds   CBC and Auto Differential   Result Value Ref Range    WBC 7.8 4.4 - 11.3 x10*3/uL    nRBC 0.0 0.0 - 0.0 /100 WBCs    RBC 4.58 4.50 - 5.90 x10*6/uL    Hemoglobin 13.2 (L) 13.5 - 17.5 g/dL    Hematocrit 41.0 41.0 - 52.0 %    MCV 90 80 - 100 fL    MCH 28.8 26.0 - 34.0 pg    MCHC 32.2 32.0 - 36.0 g/dL    RDW 15.4 (H) 11.5 - 14.5 %    Platelets 177 150 - 450 x10*3/uL    Neutrophils % 83.1 40.0 - 80.0 %    Immature Granulocytes %, Automated 0.5 0.0 - 0.9 %    Lymphocytes % 9.6 13.0 - 44.0 %    Monocytes % 6.0 2.0 - 10.0 %    Eosinophils % 0.4 0.0 - 6.0 %    Basophils % 0.4 0.0 - 2.0 %    Neutrophils Absolute 6.50 1.20 - 7.70 x10*3/uL    Immature Granulocytes Absolute, Automated 0.04 0.00 - 0.70 x10*3/uL    Lymphocytes Absolute 0.75 (L) 1.20 - 4.80 x10*3/uL    Monocytes Absolute 0.47 0.10 - 1.00 x10*3/uL    Eosinophils Absolute 0.03 0.00 - 0.70 x10*3/uL    Basophils Absolute 0.03 0.00 - 0.10 x10*3/uL   Basic Metabolic Panel   Result Value Ref Range    Glucose 315 (H) 65 - 99 mg/dL    Sodium 131 (L) 133 - 145 mmol/L    Potassium 4.2 3.4 - 5.1 mmol/L    Chloride 94 (L) 97 - 107 mmol/L    Bicarbonate 23 (L) 24 - 31 mmol/L    Urea Nitrogen 24 8 - 25 mg/dL    Creatinine 1.40 0.40 - 1.60 mg/dL    eGFR 60 (L) >60 mL/min/1.73m*2    Calcium 8.8 8.5 - 10.4 mg/dL    Anion Gap 14 <=19 mmol/L   Magnesium   Result Value Ref Range    Magnesium 1.70 1.60 - 3.10 mg/dL   Phosphorus   Result Value Ref Range    Phosphorus 3.7 2.5 - 4.5 mg/dL   Vancomycin   Result Value Ref Range    Vancomycin 15.3 10.0 - 20.0 ug/mL   POCT GLUCOSE   Result Value Ref Range    POCT Glucose 296 (H) 74 - 99 mg/dL   APTT   Result  Value Ref Range    aPTT 55.8 (H) 22.0 - 32.5 seconds   POCT GLUCOSE   Result Value Ref Range    POCT Glucose 362 (H) 74 - 99 mg/dL        Current Facility-Administered Medications:     acetaminophen (Tylenol) tablet 650 mg, 650 mg, oral, q6h PRN, Rosalva Sinha APRN-CNP, 650 mg at 05/18/24 0843    ammonium lactate (Lac-Hydrin) 12 % lotion, , Topical, q1h PRN, Jon Carney MD    carvedilol (Coreg) tablet 12.5 mg, 12.5 mg, oral, BID PRN, Alpesh Cuadra MD    dextrose 50 % injection 12.5 g, 12.5 g, intravenous, q15 min PRN, Jon Carney MD    dextrose 50 % injection 25 g, 25 g, intravenous, q15 min PRN, Jon Carney MD    [Held by provider] furosemide (Lasix) tablet 40 mg, 40 mg, oral, Daily, Jon Carney MD, 40 mg at 05/17/24 0840    glucagon (Glucagen) injection 1 mg, 1 mg, intramuscular, q15 min PRN, Jon Carney MD    glucagon (Glucagen) injection 1 mg, 1 mg, intramuscular, q15 min PRN, Jon Carney MD    heparin (porcine) injection 5,000-10,000 Units, 5,000-10,000 Units, intravenous, PRN, Jon Carney MD    heparin 25,000 Units in dextrose 5% 250 mL (100 Units/mL) infusion (premix), 0-4,500 Units/hr, intravenous, Continuous, Jon Carney MD, Last Rate: 34 mL/hr at 05/19/24 1059, 3,400 Units/hr at 05/19/24 1059    hydrALAZINE (Apresoline) injection 5 mg, 5 mg, intravenous, Once, MJ Carroll-CNP    insulin glargine (Lantus) injection 60 Units, 60 Units, subcutaneous, q12h, Alpesh Cuadra MD    insulin lispro (HumaLOG) injection 0-15 Units, 0-15 Units, subcutaneous, TID, Alpesh Cuadra MD, 15 Units at 05/19/24 1201    insulin lispro (HumaLOG) injection 12 Units, 12 Units, subcutaneous, TID, Alpesh Cuadra MD    mupirocin (Bactroban) 2 % ointment, , Topical, BID, Alpesh Cuadra MD, Given at 05/19/24 0853    oxygen (O2) therapy, , inhalation, Continuous - Inhalation, Alpesh Cuadra MD    pantoprazole (ProtoNix) EC tablet 40  mg, 40 mg, oral, Daily, Rosalva Sinha, APRN-CNP, 40 mg at 05/19/24 0853    piperacillin-tazobactam-dextrose (Zosyn) IV 4.5 g, 4.5 g, intravenous, q6h, Alpesh Cuadra MD, Stopped at 05/19/24 1230    valsartan (Diovan) tablet 40 mg, 40 mg, oral, Daily, Alpesh Cuadra MD, 40 mg at 05/19/24 0853    vancomycin (Vancocin) pharmacy to dose - pharmacy monitoring, , miscellaneous, Daily PRN, Alpesh Cuadra MD    vancomycin (Xellia) 1 g in 200 mL (Xellia) IVPB 1 g, 1 g, intravenous, q12h, Alpesh Cuadra MD, Stopped at 05/19/24 1000   Assessment/Plan   Principal Problem:    Acute pulmonary embolism, unspecified pulmonary embolism type, unspecified whether acute cor pulmonale present (Multi)  Diabetic foot ulcer  Hypertension  Hyperlipidemia  PE  Acute respiratory failure    Continue current medication.  Blood sugars running high.  Insulin dose was adjusted.  Give supportive care.  Consult ID.  Continue IV antibiotics.  We will take DVT, fall, aspiration, decubitus and DVT precaution       Alpesh Cuadra MD

## 2024-05-19 NOTE — CONSULTS
Inpatient consult to Infectious Diseases  Consult performed by: Andrea June MD  Consult ordered by: Alpesh Cuadra MD            Primary MD: Alpesh Cuadra MD    Reason For Consult  Infected right foot ulcer    History Of Present Illness  Mina Huitron is a 53 y.o. male presenting with shortness of breath.  Onset was a couple of days prior to presentation, gradual, constant, interval worsening.  He had a CT angio of his chest which was remarkable for bilateral pulmonary embolism.  He was placed on heparin drip and admitted to the intensive care unit.  He was started on anticoagulation.  He has a history of chronic right plantar foot ulcer for about 15 years.  He also has a history of type 2 diabetes with peripheral neuropathy, s/p right fourth digit amputation.  He had a wound culture that grew MRSA and Pasteurella-has a cat.  He is on IV vancomycin and Zosyn.  He denies any right foot pain.  He denies any fever or chills.  .     Past Medical History  He has no past medical history on file.    Surgical History  He has no past surgical history on file.     Social History     Occupational History    Not on file   Tobacco Use    Smoking status: Never     Passive exposure: Never    Smokeless tobacco: Never   Vaping Use    Vaping status: Never Used   Substance and Sexual Activity    Alcohol use: Never    Drug use: Never    Sexual activity: Not on file     Travel History   Travel since 04/19/24    No documented travel since 04/19/24         Family History  No family history on file.  Allergies  Patient has no known allergies.     Immunization History   Administered Date(s) Administered    Pfizer Purple Cap SARS-CoV-2 04/03/2021, 04/24/2021     Medications  Home medications:  Medications Prior to Admission   Medication Sig Dispense Refill Last Dose    carvedilol (Coreg) 12.5 mg tablet Take 1 tablet (12.5 mg) by mouth 2 times a day as needed (tachycardia).       furosemide (Lasix) 20 mg tablet Take 1  tablet (20 mg) by mouth once daily.       insulin glargine (Lantus) 100 unit/mL (3 mL) pen INJECT 65 UNITS SUBCUTANEOUSLY TWO TIMES A DAY 45 mL 1     insulin lispro (HumaLOG) 100 unit/mL injection INJECT SUBCUTANEOUSLY 35 UNITS THREE TIMES A DAY BEFORE MEALS 30 mL 2     insulin NPH, Isophane, (HumuLIN N,NovoLIN N) 100 unit/mL injection Inject 80 Units under the skin once daily at bedtime. Take as directed per insulin instructions.       insulin regular (HumuLIN R,NovoLIN R) 100 unit/mL injection Inject under the skin 3 times daily (morning, midday, late afternoon). Take as directed per insulin instructions.  Patient unsure of exact sliding scale       valsartan (Diovan) 40 mg tablet Take 1 tablet (40 mg) by mouth once daily.        Current medications:  Scheduled medications  doxycycline, 100 mg, oral, q12h NOELLE  [Held by provider] furosemide, 40 mg, oral, Daily  hydrALAZINE, 5 mg, intravenous, Once  insulin glargine, 60 Units, subcutaneous, BID  insulin lispro, 0-15 Units, subcutaneous, TID  insulin lispro, 12 Units, subcutaneous, TID  mupirocin, , Topical, BID  oxygen, , inhalation, Continuous - Inhalation  pantoprazole, 40 mg, oral, Daily  valsartan, 40 mg, oral, Daily      Continuous medications  heparin, 0-4,500 Units/hr, Last Rate: 3,400 Units/hr (05/19/24 1059)      PRN medications  PRN medications: acetaminophen, ammonium lactate, carvedilol, dextrose, dextrose, glucagon, glucagon, heparin (porcine)    Review of Systems   Constitutional:  Negative for chills and fever.   Respiratory:  Positive for shortness of breath. Negative for cough.    Cardiovascular:  Positive for leg swelling. Negative for chest pain.   Skin:  Positive for wound.   All other systems reviewed and are negative.       Objective  Range of Vitals (last 24 hours)  Heart Rate:  [104-106]   Temp:  [37.1 °C (98.8 °F)-37.4 °C (99.3 °F)]   Resp:  [16-22]   BP: (125-168)/(54-74)   Weight:  [133 kg (292 lb 12.3 oz)]   SpO2:  [93 %-99 %]   Daily  Weight  05/19/24 : 133 kg (292 lb 12.3 oz)    Body mass index is 38.63 kg/m².     Physical Exam  Constitutional:       Appearance: Normal appearance.   HENT:      Head: Normocephalic and atraumatic.      Right Ear: External ear normal.      Left Ear: External ear normal.      Nose: Nose normal.   Eyes:      General: No scleral icterus.     Extraocular Movements: Extraocular movements intact.      Conjunctiva/sclera: Conjunctivae normal.   Cardiovascular:      Heart sounds: Normal heart sounds, S1 normal and S2 normal.   Pulmonary:      Breath sounds: Decreased breath sounds present.   Abdominal:      General: Bowel sounds are normal.      Palpations: Abdomen is soft.   Musculoskeletal:      Cervical back: Normal range of motion and neck supple.      Right lower leg: Edema present.      Left lower leg: Edema present.      Right foot: Charcot foot present.   Feet:      Right foot:      Skin integrity: Ulcer and callus present.      Comments: Right mid plantar foot ulcer with large amount of granulation tissue  Skin:     General: Skin is warm and dry.      Comments: Right mid plantar ulcer   Neurological:      Mental Status: He is alert.   Psychiatric:         Behavior: Behavior normal. Behavior is cooperative.          Relevant Results  Outside Hospital Results    Labs  Results from last 72 hours   Lab Units 05/19/24  0429 05/18/24  0521 05/17/24  0520   WBC AUTO x10*3/uL 7.8 10.2 7.4   HEMOGLOBIN g/dL 13.2* 14.9 14.1   HEMATOCRIT % 41.0 45.5 43.7   PLATELETS AUTO x10*3/uL 177 259 238   NEUTROS PCT AUTO % 83.1 67.1 54.9   LYMPHS PCT AUTO % 9.6 22.3 31.8   MONOS PCT AUTO % 6.0 7.6 7.7   EOS PCT AUTO % 0.4 1.9 4.6     Results from last 72 hours   Lab Units 05/19/24  0429 05/18/24  0521 05/17/24  0520   SODIUM mmol/L 131* 136 139   POTASSIUM mmol/L 4.2 3.8 4.2   CHLORIDE mmol/L 94* 98 101   CO2 mmol/L 23* 26 28   BUN mg/dL 24 17 15   CREATININE mg/dL 1.40 1.00 0.90   GLUCOSE mg/dL 315* 275* 207*   CALCIUM mg/dL 8.8 9.5  "9.1   ANION GAP mmol/L 14 12 10   EGFR mL/min/1.73m*2 60* 90 >90   PHOSPHORUS mg/dL 3.7 3.8 3.4         Estimated Creatinine Clearance: 87.2 mL/min (by C-G formula based on SCr of 1.4 mg/dL).  No results found for: \"CRP\", \"SEDRATE\"  No results found for: \"HIV1X2\", \"HIVCONF\", \"FXUANH2XY\"  No results found for: \"HEPCABINIT\", \"HEPCAB\", \"HCVPCRQUANT\"  Microbiology  Susceptibility data from last 90 days.  Collected Specimen Info Organism Clindamycin Erythromycin Oxacillin Tetracycline Trimethoprim/Sulfamethoxazole Vancomycin   05/15/24 Tissue/Biopsy from Diabetic Foot Ulcer Methicillin Resistant Staphylococcus aureus (MRSA)  S  R  R  S  S  S     Pasteurella multocida           Mixed Anaerobic Bacteria           Mixed Gram-Positive Bacteria              Imaging  XR chest 1 view    Result Date: 5/17/2024  Interpreted By:  Pattie Snyder, STUDY: XR CHEST 1 VIEW 5/17/2024 4:11 pm   INDICATION: Signs/Symptoms:Hypoxia   COMPARISON: None available.   ACCESSION NUMBER(S): [id number]   ORDERING CLINICIAN: LUX DILL   TECHNIQUE: AP erect view of the chest   FINDINGS: The cardiac size is within normal limits. The lungs are clear. No pleural abnormality is seen. There are mild degenerative changes of the thoracic spine.       No acute cardiopulmonary disease.   Signed by: Pattie Snyder 5/17/2024 4:21 PM Dictation workstation:   [id number]    Transthoracic Echo (TTE) Complete    Addendum Date: 5/16/2024    RV is mild to moderately dilated and RV systolic function is mildly reduced. [id number] Ricardo Cortez MD Electronically Amended 5/16/2024, 12:47 PM  Final (Amended)     Result Date: 5/16/2024           [place] [address]            Phone [phone] TRANSTHORACIC ECHOCARDIOGRAM REPORT  Patient Name:      JAYLENE VARELA       Reading Physician:    [id number] Ricardo Cortez MD Study Date:        5/15/2024             " Ordering Provider:    [id number] BRIAN ANDRADE MRN/PID:           [id number]              Fellow: Accession#:        [id number]          Nurse: Date of Birth/Age: 1970 / 53 years  Sonographer:          Tanner Delgadillo RDCS Gender:            M                     Additional Staff: Height:            182.00 cm             Admit Date: Weight:            139.00 kg             Admission Status:     Inpatient -                                                                Routine BSA / BMI:         2.55 m2 / 41.96 kg/m2 Department Location:  [facility] Blood Pressure: 152 /77 mmHg Study Type:    TRANSTHORACIC ECHO (TTE) COMPLETE Diagnosis/ICD: Chronic pulmonary embolism-I27.82 Indication:    PE with Edema CPT Codes:     Echo Complete w Full Doppler-[id number] Patient History: Pertinent History: HTN, Hyperlipidemia, LE Edema, Dyspnea and Syncope. PE,                    Elevated Troponin. Study Detail: The following Echo studies were performed: 2D, M-Mode, Doppler and               color flow. Technically challenging study due to poor acoustic               windows. Definity used as a contrast agent for endocardial border               definition. Total contrast used for this procedure was 1 mL via IV               push.  PHYSICIAN INTERPRETATION: Left Ventricle: Left ventricular systolic function is normal, with an estimated ejection fraction of 60%. There are no regional wall motion abnormalities. The left ventricular cavity size is normal. Spectral Doppler shows an impaired relaxation pattern of left ventricular diastolic filling. Left Atrium: The left atrium is normal in size. Right Ventricle: The right ventricle is normal in size. There is normal right ventricular global systolic function. Right Atrium: The right atrium is normal in size. Aortic Valve: The aortic valve is trileaflet. There is  no evidence of aortic valve regurgitation. The peak instantaneous gradient of the aortic valve is 4.4 mmHg. The mean gradient of the aortic valve is 2.0 mmHg. Mitral Valve: The mitral valve is normal in structure. There is trace mitral valve regurgitation. Tricuspid Valve: The tricuspid valve is structurally normal. There is physiological tricuspid regurgitation. The right ventricular systolic pressure is unable to be estimated. Pulmonic Valve: The pulmonic valve is not well visualized. The pulmonic valve regurgitation was not well visualized. Pericardium: There is no pericardial effusion noted. Aorta: The aortic root is normal.  CONCLUSIONS:  1. Left ventricular systolic function is normal with a 60% estimated ejection fraction.  2. Spectral Doppler shows an impaired relaxation pattern of left ventricular diastolic filling.  3. Trace mitral valve regurgitation.  4. Physiological tricuspid regurgitation. QUANTITATIVE DATA SUMMARY: 2D MEASUREMENTS:                          Normal Ranges: LAs:           3.60 cm   (2.7-4.0cm) IVSd:          0.92 cm   (0.6-1.1cm) LVPWd:         0.79 cm   (0.6-1.1cm) LVIDd:         4.67 cm   (3.9-5.9cm) LVIDs:         3.98 cm LV Mass Index: 51.8 g/m2 LV % FS        14.8 % LA VOLUME:                               Normal Ranges: LA Vol A4C:        58.6 ml    (22+/-6mL/m2) LA Vol A2C:        62.2 ml LA Vol BP:         61.0 ml LA Vol Index A4C:  23.0ml/m2 LA Vol Index A2C:  24.5 ml/m2 LA Vol Index BP:   24.0 ml/m2 LA Area A4C:       19.3 cm2 LA Area A2C:       19.7 cm2 LA Major Axis A4C: 5.4 cm LA Major Axis A2C: 5.3 cm LA Volume Index:   24.0 ml/m2 LA Vol A4C:        55.0 ml LA Vol A2C:        60.0 ml LV SYSTOLIC FUNCTION BY 2D PLANIMETRY (MOD):                     Normal Ranges: EF-A4C View: 60.5 % (>=55%) EF-A2C View: 63.6 % EF-Biplane:  61.0 % LV DIASTOLIC FUNCTION:                        Normal Ranges: MV Peak E:    0.84 m/s (0.7-1.2 m/s) MV Peak A:    0.93 m/s (0.42-0.7 m/s) E/A Ratio:     0.90     (1.0-2.2) MV lateral e' 0.07 m/s MV medial e'  0.05 m/s MITRAL VALVE:                 Normal Ranges: MV DT: 205 msec (150-240msec) AORTIC VALVE:                                   Normal Ranges: AoV Vmax:                1.05 m/s (<=1.7m/s) AoV Peak P.4 mmHg (<20mmHg) AoV Mean P.0 mmHg (1.7-11.5mmHg) LVOT Max Robin:            0.95 m/s (<=1.1m/s) AoV VTI:                 17.50 cm (18-25cm) LVOT VTI:                14.90 cm LVOT Diameter:           2.00 cm  (1.8-2.4cm) AoV Area, VTI:           2.67 cm2 (2.5-5.5cm2) AoV Area,Vmax:           2.83 cm2 (2.5-4.5cm2) AoV Dimensionless Index: 0.85  RIGHT VENTRICLE: RV Basal 3.81 cm RV Mid   3.95 cm RV Major 8.3 cm TAPSE:   25.9 mm RV s'    0.09 m/s TRICUSPID VALVE/RVSP:                   Normal Ranges: IVC Diam: 2.09 cm PULMONIC VALVE:                      Normal Ranges: PV Max Robin: 0.8 m/s  (0.6-0.9m/s) PV Max P.4 mmHg  [id number] Ricardo Cortez MD Electronically signed on 2024 at 12:44:56 PM  ** Final **     XR foot right 3+ views    Result Date: 2024  Interpreted By:  Raegan Larson, STUDY: XR FOOT RIGHT 3+ VIEWS 5/15/2024 5:22 pm   INDICATION: Signs/Symptoms:chronic right foot ulcer plantrar   COMPARISON: None available.   ACCESSION NUMBER(S): [id number]   ORDERING CLINICIAN: JAYLENE BNAEGAS   TECHNIQUE: AP, lateral common oblique views of the right foot   FINDINGS: Hindfoot osseous structures demonstrate large calcaneal spurring. There is collapse of the talus with component of disorganized architecture and sequela of remote fracture and fragmentation. Of note, there is osseous fusion across the intertarsal articulations and sequela of Charcot arthropathy with fusion across the Lisfranc joint. There is persistent visualization of the calcaneocuboid articulation with eburnation noted as well as in the distribution of the talonavicular articulation. No acute fracture line within the visualized portions of the hindfoot  within limits of this plain film. Forefoot metatarsal shafts demonstrate no evidence for fracture. There is postop operative resection 4th digit at the MTP joint. 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 Digits distally demonstrate no evidence for erosions.             1. Osseous fusion across the tarsal and tarsometatarsal articulations with sequela of Charcot arthropathy. Persistent visualization of the calcaneocuboid and talonavicular articulations   2. Skin ulceration plantar margin of the midfoot. However, no cortical erosion by plain film.   Signed by: Raegan Larson 5/16/2024 9:36 AM Dictation workstation:   [id number]    ECG 12 lead    Result Date: 5/15/2024  Sinus tachycardia Otherwise normal ECG No previous ECGs available Confirmed by Kole Durand ([id number]) on 5/15/2024 12:16:24 PM    Vascular US lower extremity venous duplex bilateral    Result Date: 5/14/2024  Interpreted By:  Phil Tejeda, STUDY: [facility] US LOWER EXTREMITY VENOUS DUPLEX BILATERAL  5/14/2024 5:10 pm   INDICATION: 52 y/o   M with  Signs/Symptoms:bilateral PEs. LMP:  Unknown.   COMPARISON: None.   ACCESSION NUMBER(S): [id number]   ORDERING CLINICIAN: NICKOLAS BOYKIN   TECHNIQUE: Routine ultrasound of the  bilateral lower extremities was performed with duplex Doppler (color and spectral) evaluation.   Static images were obtained for remote interpretation.     FINDINGS: Right lower extremity: All visualized deep veins in the right lower extremity are patent with no thrombosis. The veins are compressible and demonstrate normal augmentation.   Left lower extremity: The junction of the greater saphenous and common femoral veins is not compressible, which may be due to nonocclusive thrombosis. All other deep veins in the femoropopliteal segment of the left lower extremity are patent and compressible with no evidence of thrombosis. The left calf veins could not be identified.       Questionable nonocclusive thrombosis at the junction of the left  greater saphenous vein and common femoral vein.   No DVT in the right lower extremity.   MACRO: None   Signed by: Phil Tejeda 5/14/2024 5:49 PM Dictation workstation:   [id number]    CT angio chest for pulmonary embolism    Result Date: 5/14/2024  Interpreted By:  Cameron Navarro, STUDY: CT ANGIO CHEST FOR PULMONARY EMBOLISM; 5/14/2024 11:16 am   INDICATION: Signs/Symptoms:short of breath, tachycardic, history of PE.   COMPARISON: None   ACCESSION NUMBER(S): [id number]   ORDERING CLINICIAN: NICKOLAS BOYKIN   TECHNIQUE: Contiguous axial images of the thorax were obtained from the level of the thoracic inlet through the lung bases. 3-D MIPS were created, processed and reviewed on a separate workstation. 100 ml of Omnipaque 350 was utilized. All CT examinations are performed with 1 or more of the following dose reduction techniques: Automated exposure control, adjustment of mA and/or kv according to patient's size, or use of iterative reconstruction techniques.   FINDINGS: The thyroid gland is unremarkable.   There is adequate contrast opacification of the pulmonary arterial vasculature. Extensive filling defects consistent with pulmonary emboli are seen bilaterally. There is prominent pulmonary embolus at the distal main right and distal main left pulmonary emboli with extensive thrombus extending throughout the majority of segmental and subsegmental branches bilaterally. However, no evidence for right heart strain or significant septal deviation.   The heart size is within normal limits.  No pericardial effusion is identified. The aorta and pulmonary arteries are normal in caliber.   There are no pathologically enlarged mediastinal, hilar, or axillary lymph nodes are seen.   The trachea and mainstem bronchi are patent. No suspicious pulmonary nodules are seen. No significant consolidation. There is no evidence of pneumothorax or pleural effusion.   The visualized osseous structures are intact.   Limited  images through the upper abdomen are unremarkable.       1. (+) PE.  Extensive bilateral pulmonary emboli. No evidence of right heart strain. 2. No significant airspace opacity or consolidation.     Signed by: Cameron Navarro 5/14/2024 11:49 AM Dictation workstation:   [id number]     Assessment/Plan   Type 2 diabetes with peripheral angiopathy without gangrene  Right diabetic foot ulcer, Gan 2  Polymicrobial infection of right mid plantar ulcer-MRSA and Pasteurella  Right foot Charcot deformity  Evolving acute kidney injury-creatinine increased from 0.9-1.4      Discontinue vancomycin  Discontinue Zosyn  Doxycycline to complete total of 14 days-5/31/2024-covers both MRSA and Pasteurella  Local care  Offload  Monitor temperature and WBC    Andrea June MD

## 2024-05-19 NOTE — PROGRESS NOTES
Mina Huitron is a 53 y.o. male on day 4 of admission presenting with Acute pulmonary embolism, unspecified pulmonary embolism type, unspecified whether acute cor pulmonale present (Multi).    Subjective   The patient was seen and examined.  Lying in the bed.  Comfortable.  Not in acute distress.  Patient denies any headache, dizziness, nausea or vomiting.  No diarrhea, dysuria, hematuria or frequency.       Objective     Physical Exam  HEENT:  Head externally atraumatic,  extraocular movements intact, oral mucosa moist  Neck:  Supple, no JVP, no palpable adenopathy or thyromegaly.  No carotid bruit.  Chest:  Clear to auscultation and resonant.  Heart:  Regular rate and rhythm, no murmur or gallop could be appreciated.  Abdomen:  Soft, nontender, bowel sounds present, normoactive, no palpable hepatosplenomegaly.  Extremities:  No edema, pulses present, no cyanosis or clubbing.  CNS:  Patient alert, oriented to time, place and person.    No new deficit.  Cranial nerves 2-12 grossly intact  Skin:  No active rash.  Musculoskeletal:  No  apparent joint swelling or erythema, range of movement normal.  Last Recorded Vitals  Heart Rate:  [101-124]   Temp:  [36.1 °C (97 °F)-37.2 °C (99 °F)]   Resp:  [16-28]   BP: (130-206)/()   Weight:  [135 kg (298 lb 1 oz)]   SpO2:  [83 %-100 %]     Intake/Output last 3 Shifts:  I/O last 3 completed shifts:  In: 1300 (9.6 mL/kg) [P.O.:480; I.V.:820 (6.1 mL/kg)]  Out: 150 (1.1 mL/kg) [Urine:150 (0 mL/kg/hr)]  Weight: 135.2 kg     Relevant Results  Susceptibility data from last 90 days.  Collected Specimen Info Organism   05/15/24 Tissue/Biopsy from Diabetic Foot Ulcer Staphylococcus aureus     Pasteurella multocida     Mixed Anaerobic Bacteria     Mixed Gram-Positive Bacteria      Results for orders placed or performed during the hospital encounter of 05/14/24 (from the past 24 hour(s))   POCT GLUCOSE   Result Value Ref Range    POCT Glucose 439 (H) 74 - 99 mg/dL   aPTT   Result  Value Ref Range    aPTT 53.8 (H) 22.0 - 32.5 seconds   POCT GLUCOSE   Result Value Ref Range    POCT Glucose 385 (H) 74 - 99 mg/dL   CBC and Auto Differential   Result Value Ref Range    WBC 10.2 4.4 - 11.3 x10*3/uL    nRBC 0.0 0.0 - 0.0 /100 WBCs    RBC 5.20 4.50 - 5.90 x10*6/uL    Hemoglobin 14.9 13.5 - 17.5 g/dL    Hematocrit 45.5 41.0 - 52.0 %    MCV 88 80 - 100 fL    MCH 28.7 26.0 - 34.0 pg    MCHC 32.7 32.0 - 36.0 g/dL    RDW 14.4 11.5 - 14.5 %    Platelets 259 150 - 450 x10*3/uL    Neutrophils % 67.1 40.0 - 80.0 %    Immature Granulocytes %, Automated 0.7 0.0 - 0.9 %    Lymphocytes % 22.3 13.0 - 44.0 %    Monocytes % 7.6 2.0 - 10.0 %    Eosinophils % 1.9 0.0 - 6.0 %    Basophils % 0.4 0.0 - 2.0 %    Neutrophils Absolute 6.85 1.20 - 7.70 x10*3/uL    Immature Granulocytes Absolute, Automated 0.07 0.00 - 0.70 x10*3/uL    Lymphocytes Absolute 2.28 1.20 - 4.80 x10*3/uL    Monocytes Absolute 0.78 0.10 - 1.00 x10*3/uL    Eosinophils Absolute 0.19 0.00 - 0.70 x10*3/uL    Basophils Absolute 0.04 0.00 - 0.10 x10*3/uL   Basic Metabolic Panel   Result Value Ref Range    Glucose 275 (H) 65 - 99 mg/dL    Sodium 136 133 - 145 mmol/L    Potassium 3.8 3.4 - 5.1 mmol/L    Chloride 98 97 - 107 mmol/L    Bicarbonate 26 24 - 31 mmol/L    Urea Nitrogen 17 8 - 25 mg/dL    Creatinine 1.00 0.40 - 1.60 mg/dL    eGFR 90 >60 mL/min/1.73m*2    Calcium 9.5 8.5 - 10.4 mg/dL    Anion Gap 12 <=19 mmol/L   Magnesium   Result Value Ref Range    Magnesium 1.70 1.60 - 3.10 mg/dL   Phosphorus   Result Value Ref Range    Phosphorus 3.8 2.5 - 4.5 mg/dL   Light Blue Top   Result Value Ref Range    Extra Tube Hold for add-ons.    aPTT   Result Value Ref Range    aPTT 68.3 (H) 22.0 - 32.5 seconds   POCT GLUCOSE   Result Value Ref Range    POCT Glucose 286 (H) 74 - 99 mg/dL   aPTT   Result Value Ref Range    aPTT 48.2 (H) 22.0 - 32.5 seconds   POCT GLUCOSE   Result Value Ref Range    POCT Glucose 286 (H) 74 - 99 mg/dL   POCT GLUCOSE   Result Value Ref  Range    POCT Glucose 273 (H) 74 - 99 mg/dL   Magnesium   Result Value Ref Range    Magnesium 2.00 1.60 - 3.10 mg/dL   POCT GLUCOSE   Result Value Ref Range    POCT Glucose 256 (H) 74 - 99 mg/dL        Current Facility-Administered Medications:     acetaminophen (Tylenol) tablet 650 mg, 650 mg, oral, q6h PRN, Rosalva Sinha, APRN-CNP, 650 mg at 05/18/24 0843    ammonium lactate (Lac-Hydrin) 12 % lotion, , Topical, q1h PRN, Jon Carney MD    carvedilol (Coreg) tablet 12.5 mg, 12.5 mg, oral, BID PRN, Alpesh Cuadra MD    dextrose 50 % injection 12.5 g, 12.5 g, intravenous, q15 min PRN, Jon Carney MD    dextrose 50 % injection 25 g, 25 g, intravenous, q15 min PRN, Jon Carney MD    [Held by provider] furosemide (Lasix) tablet 40 mg, 40 mg, oral, Daily, Jon Carney MD, 40 mg at 05/17/24 0840    glucagon (Glucagen) injection 1 mg, 1 mg, intramuscular, q15 min PRN, Jon Carney MD    glucagon (Glucagen) injection 1 mg, 1 mg, intramuscular, q15 min PRN, Jon Carney MD    heparin (porcine) injection 5,000-10,000 Units, 5,000-10,000 Units, intravenous, PRN, Jon Carney MD    heparin 25,000 Units in dextrose 5% 250 mL (100 Units/mL) infusion (premix), 0-4,500 Units/hr, intravenous, Continuous, Jon Carney MD, Last Rate: 31 mL/hr at 05/18/24 1112, 3,100 Units/hr at 05/18/24 1112    hydrALAZINE (Apresoline) injection 5 mg, 5 mg, intravenous, Once, Shellie Iyer, MJ-CNP    insulin glargine (Lantus) injection 80 Units, 80 Units, subcutaneous, Nightly, Alpesh Cuadra MD    insulin lispro (HumaLOG) injection 0-15 Units, 0-15 Units, subcutaneous, TID, Alpesh Cuadra MD, 9 Units at 05/18/24 1656    [START ON 5/19/2024] insulin lispro (HumaLOG) injection 7 Units, 7 Units, subcutaneous, TID, Alpesh Cuadra MD    oxygen (O2) therapy, , inhalation, Continuous - Inhalation, Alpesh Cuadra MD, 45 percent at 05/18/24 1920    pantoprazole (ProtoNix) EC  tablet 40 mg, 40 mg, oral, Daily, MJ Terry-CNP, 40 mg at 05/18/24 0952    valsartan (Diovan) tablet 40 mg, 40 mg, oral, Daily, Alpesh Cuadra MD, 40 mg at 05/18/24 0952   Assessment/Plan   Principal Problem:    Acute pulmonary embolism, unspecified pulmonary embolism type, unspecified whether acute cor pulmonale present (Multi)  DVT  Hypertension  Hyperlipidemia  Diabetes mellitus type 2  Hyperlipidemia  Anemia chronic disease  Obesity  Diabetic foot ulcer    Plan: Continue current medication.  Supportive care.  Patient blood pressure is fairly controlled.  Heart rate is better now.  Patient blood sugars running high.  Renal dose adjusted.  We will take DVT, fall, aspiration and decubitus questions.  Pain controlled.  Patient is having low-grade fever.  Consult ID.  Start antibiotic empirically.         Alpesh Cuadra MD      sip H2O with Famotidine @ 7:30 am

## 2024-05-20 LAB
ANION GAP SERPL CALC-SCNC: 14 MMOL/L
APTT PPP: 40.7 SECONDS (ref 22–32.5)
APTT PPP: 83.2 SECONDS (ref 22–32.5)
APTT PPP: 88.8 SECONDS (ref 22–32.5)
BASOPHILS # BLD AUTO: 0.05 X10*3/UL (ref 0–0.1)
BASOPHILS NFR BLD AUTO: 1 %
BUN SERPL-MCNC: 28 MG/DL (ref 8–25)
CALCIUM SERPL-MCNC: 8.7 MG/DL (ref 8.5–10.4)
CHLORIDE SERPL-SCNC: 98 MMOL/L (ref 97–107)
CO2 SERPL-SCNC: 21 MMOL/L (ref 24–31)
CREAT SERPL-MCNC: 1.2 MG/DL (ref 0.4–1.6)
EGFRCR SERPLBLD CKD-EPI 2021: 72 ML/MIN/1.73M*2
EOSINOPHIL # BLD AUTO: 0.35 X10*3/UL (ref 0–0.7)
EOSINOPHIL NFR BLD AUTO: 6.9 %
ERYTHROCYTE [DISTWIDTH] IN BLOOD BY AUTOMATED COUNT: 15.3 % (ref 11.5–14.5)
GLUCOSE BLD MANUAL STRIP-MCNC: 185 MG/DL (ref 74–99)
GLUCOSE BLD MANUAL STRIP-MCNC: 235 MG/DL (ref 74–99)
GLUCOSE BLD MANUAL STRIP-MCNC: 253 MG/DL (ref 74–99)
GLUCOSE BLD MANUAL STRIP-MCNC: 338 MG/DL (ref 74–99)
GLUCOSE SERPL-MCNC: 268 MG/DL (ref 65–99)
HCT VFR BLD AUTO: 37.7 % (ref 41–52)
HGB BLD-MCNC: 12.2 G/DL (ref 13.5–17.5)
HOLD SPECIMEN: NORMAL
IMM GRANULOCYTES # BLD AUTO: 0.03 X10*3/UL (ref 0–0.7)
IMM GRANULOCYTES NFR BLD AUTO: 0.6 % (ref 0–0.9)
LYMPHOCYTES # BLD AUTO: 1.05 X10*3/UL (ref 1.2–4.8)
LYMPHOCYTES NFR BLD AUTO: 20.7 %
MAGNESIUM SERPL-MCNC: 1.9 MG/DL (ref 1.6–3.1)
MCH RBC QN AUTO: 28.6 PG (ref 26–34)
MCHC RBC AUTO-ENTMCNC: 32.4 G/DL (ref 32–36)
MCV RBC AUTO: 89 FL (ref 80–100)
MONOCYTES # BLD AUTO: 0.6 X10*3/UL (ref 0.1–1)
MONOCYTES NFR BLD AUTO: 11.8 %
NEUTROPHILS # BLD AUTO: 3 X10*3/UL (ref 1.2–7.7)
NEUTROPHILS NFR BLD AUTO: 59 %
NRBC BLD-RTO: 0 /100 WBCS (ref 0–0)
PHOSPHATE SERPL-MCNC: 3.7 MG/DL (ref 2.5–4.5)
PLATELET # BLD AUTO: 172 X10*3/UL (ref 150–450)
POTASSIUM SERPL-SCNC: 4.1 MMOL/L (ref 3.4–5.1)
RBC # BLD AUTO: 4.26 X10*6/UL (ref 4.5–5.9)
SODIUM SERPL-SCNC: 133 MMOL/L (ref 133–145)
WBC # BLD AUTO: 5.1 X10*3/UL (ref 4.4–11.3)

## 2024-05-20 PROCEDURE — 83735 ASSAY OF MAGNESIUM: CPT | Performed by: INTERNAL MEDICINE

## 2024-05-20 PROCEDURE — 2500000002 HC RX 250 W HCPCS SELF ADMINISTERED DRUGS (ALT 637 FOR MEDICARE OP, ALT 636 FOR OP/ED): Performed by: INTERNAL MEDICINE

## 2024-05-20 PROCEDURE — 82374 ASSAY BLOOD CARBON DIOXIDE: CPT | Performed by: INTERNAL MEDICINE

## 2024-05-20 PROCEDURE — 1200000002 HC GENERAL ROOM WITH TELEMETRY DAILY

## 2024-05-20 PROCEDURE — 2500000006 HC RX 250 W HCPCS SELF ADMINISTERED DRUGS (ALT 637 FOR ALL PAYERS): Performed by: INTERNAL MEDICINE

## 2024-05-20 PROCEDURE — 2500000001 HC RX 250 WO HCPCS SELF ADMINISTERED DRUGS (ALT 637 FOR MEDICARE OP): Performed by: NURSE PRACTITIONER

## 2024-05-20 PROCEDURE — 36415 COLL VENOUS BLD VENIPUNCTURE: CPT | Performed by: INTERNAL MEDICINE

## 2024-05-20 PROCEDURE — 85730 THROMBOPLASTIN TIME PARTIAL: CPT | Performed by: INTERNAL MEDICINE

## 2024-05-20 PROCEDURE — 2500000001 HC RX 250 WO HCPCS SELF ADMINISTERED DRUGS (ALT 637 FOR MEDICARE OP): Performed by: INTERNAL MEDICINE

## 2024-05-20 PROCEDURE — 84100 ASSAY OF PHOSPHORUS: CPT | Performed by: INTERNAL MEDICINE

## 2024-05-20 PROCEDURE — 2500000004 HC RX 250 GENERAL PHARMACY W/ HCPCS (ALT 636 FOR OP/ED): Performed by: INTERNAL MEDICINE

## 2024-05-20 PROCEDURE — 85025 COMPLETE CBC W/AUTO DIFF WBC: CPT | Performed by: INTERNAL MEDICINE

## 2024-05-20 PROCEDURE — 82947 ASSAY GLUCOSE BLOOD QUANT: CPT

## 2024-05-20 PROCEDURE — 2500000005 HC RX 250 GENERAL PHARMACY W/O HCPCS: Performed by: INTERNAL MEDICINE

## 2024-05-20 RX ORDER — GUAIFENESIN 600 MG/1
600 TABLET, EXTENDED RELEASE ORAL 2 TIMES DAILY PRN
Status: DISCONTINUED | OUTPATIENT
Start: 2024-05-20 | End: 2024-05-21 | Stop reason: HOSPADM

## 2024-05-20 RX ORDER — ENOXAPARIN SODIUM 150 MG/ML
120 INJECTION SUBCUTANEOUS 2 TIMES DAILY
Qty: 60 EACH | Refills: 0 | Status: SHIPPED | OUTPATIENT
Start: 2024-05-20 | End: 2024-05-21 | Stop reason: HOSPADM

## 2024-05-20 RX ADMIN — INSULIN LISPRO 12 UNITS: 100 INJECTION, SOLUTION INTRAVENOUS; SUBCUTANEOUS at 17:00

## 2024-05-20 RX ADMIN — HEPARIN SODIUM 3300 UNITS/HR: 10000 INJECTION, SOLUTION INTRAVENOUS at 23:59

## 2024-05-20 RX ADMIN — INSULIN LISPRO 12 UNITS: 100 INJECTION, SOLUTION INTRAVENOUS; SUBCUTANEOUS at 08:00

## 2024-05-20 RX ADMIN — HEPARIN SODIUM 3500 UNITS/HR: 10000 INJECTION, SOLUTION INTRAVENOUS at 16:17

## 2024-05-20 RX ADMIN — INSULIN LISPRO 12 UNITS: 100 INJECTION, SOLUTION INTRAVENOUS; SUBCUTANEOUS at 12:19

## 2024-05-20 RX ADMIN — VALSARTAN 40 MG: 80 TABLET, FILM COATED ORAL at 08:57

## 2024-05-20 RX ADMIN — MUPIROCIN 1 APPLICATION: 20 OINTMENT TOPICAL at 06:34

## 2024-05-20 RX ADMIN — GUAIFENESIN 600 MG: 600 TABLET ORAL at 20:48

## 2024-05-20 RX ADMIN — INSULIN LISPRO 3 UNITS: 100 INJECTION, SOLUTION INTRAVENOUS; SUBCUTANEOUS at 17:26

## 2024-05-20 RX ADMIN — DOXYCYCLINE HYCLATE 100 MG: 100 CAPSULE ORAL at 20:48

## 2024-05-20 RX ADMIN — PANTOPRAZOLE SODIUM 40 MG: 40 TABLET, DELAYED RELEASE ORAL at 08:57

## 2024-05-20 RX ADMIN — INSULIN LISPRO 12 UNITS: 100 INJECTION, SOLUTION INTRAVENOUS; SUBCUTANEOUS at 12:00

## 2024-05-20 RX ADMIN — DOXYCYCLINE HYCLATE 100 MG: 100 CAPSULE ORAL at 08:57

## 2024-05-20 RX ADMIN — INSULIN LISPRO 9 UNITS: 100 INJECTION, SOLUTION INTRAVENOUS; SUBCUTANEOUS at 08:59

## 2024-05-20 RX ADMIN — INSULIN GLARGINE 60 UNITS: 100 INJECTION, SOLUTION SUBCUTANEOUS at 08:58

## 2024-05-20 RX ADMIN — CARVEDILOL 12.5 MG: 12.5 TABLET, FILM COATED ORAL at 08:58

## 2024-05-20 RX ADMIN — Medication 21 PERCENT: at 20:00

## 2024-05-20 RX ADMIN — Medication 4 L/MIN: at 08:00

## 2024-05-20 RX ADMIN — INSULIN GLARGINE 60 UNITS: 100 INJECTION, SOLUTION SUBCUTANEOUS at 21:21

## 2024-05-20 RX ADMIN — HEPARIN SODIUM 3400 UNITS/HR: 10000 INJECTION, SOLUTION INTRAVENOUS at 08:12

## 2024-05-20 RX ADMIN — ACETAMINOPHEN 650 MG: 325 TABLET ORAL at 17:25

## 2024-05-20 ASSESSMENT — PAIN - FUNCTIONAL ASSESSMENT
PAIN_FUNCTIONAL_ASSESSMENT: 0-10
PAIN_FUNCTIONAL_ASSESSMENT: 0-10

## 2024-05-20 ASSESSMENT — PAIN SCALES - GENERAL
PAINLEVEL_OUTOF10: 0 - NO PAIN
PAINLEVEL_OUTOF10: 5 - MODERATE PAIN
PAINLEVEL_OUTOF10: 0 - NO PAIN
PAINLEVEL_OUTOF10: 0 - NO PAIN

## 2024-05-20 ASSESSMENT — COGNITIVE AND FUNCTIONAL STATUS - GENERAL
MOBILITY SCORE: 24
DAILY ACTIVITIY SCORE: 24

## 2024-05-20 ASSESSMENT — ENCOUNTER SYMPTOMS: SHORTNESS OF BREATH: 1

## 2024-05-20 ASSESSMENT — PAIN SCALES - WONG BAKER: WONGBAKER_NUMERICALRESPONSE: NO HURT

## 2024-05-20 NOTE — NURSING NOTE
Inpatient Diabetes Education follow up:    POCT glucose today 5/20/24 at 07:52 was 253 mg/dl and at 11:20 was 338 mg/dl.    Change in diabetes medications:  Added:  Humalog 12 units TID  Still getting Lantus 60 units BID  Humalog 0- 15 units TID    Called patient In his room X [phone].  Denies having any questions and/or concerns.  Agreeable to outpatient diabetes education to meet with a dietitian.  Will refer

## 2024-05-20 NOTE — PROGRESS NOTES
Mina Huitron is a 53 y.o. male on day 6 of admission presenting with Acute pulmonary embolism, unspecified pulmonary embolism type, unspecified whether acute cor pulmonale present (Multi).    Chart reviewed.     Subjective   Patient seen and examined.  Resting sitting up in the chair in no acute distress.  Awake alert oriented x 3.  Better.  No complaints.  No chest pain, shortness of breath.  Right foot pain, controlled.  No fevers chills or sweats.  Confirms no medical insurance.    Objective     Physical Exam  Vitals and nursing note reviewed.   Constitutional:       General: He is not in acute distress.     Appearance: Normal appearance. He is obese. He is not ill-appearing, toxic-appearing or diaphoretic.   HENT:      Head: Normocephalic and atraumatic.      Right Ear: Tympanic membrane normal.      Left Ear: Tympanic membrane normal.      Nose: Nose normal.      Mouth/Throat:      Mouth: Mucous membranes are moist.      Pharynx: Oropharynx is clear.   Eyes:      Extraocular Movements: Extraocular movements intact.      Conjunctiva/sclera: Conjunctivae normal.      Pupils: Pupils are equal, round, and reactive to light.   Cardiovascular:      Rate and Rhythm: Normal rate and regular rhythm.      Pulses: Normal pulses.      Heart sounds: Normal heart sounds. No murmur heard.  Pulmonary:      Effort: Pulmonary effort is normal. No respiratory distress.      Breath sounds: Normal breath sounds. No wheezing, rhonchi or rales.      Comments: Room air.  Abdominal:      General: Bowel sounds are normal. There is no distension.      Palpations: Abdomen is soft.      Tenderness: There is no abdominal tenderness.   Genitourinary:     Comments: Deferred.  Musculoskeletal:         General: Swelling present. No tenderness. Normal range of motion.      Cervical back: Normal range of motion and neck supple.      Right lower leg: Edema present.      Left lower leg: Edema present.   Skin:     General: Skin is warm and dry.  "     Capillary Refill: Capillary refill takes less than 2 seconds.      Comments: Right lower extremity foot dressing clean dry intact.   Neurological:      General: No focal deficit present.      Mental Status: He is alert and oriented to person, place, and time.   Psychiatric:         Mood and Affect: Mood normal.         Behavior: Behavior normal.       Last Recorded Vitals  Blood pressure 128/71, pulse 86, temperature 36.4 °C (97.5 °F), temperature source Temporal, resp. rate 16, height 1.854 m (6' 1\"), weight 133 kg (292 lb 12.3 oz), SpO2 92%.    Intake/Output last 3 Shifts:  I/O last 3 completed shifts:  In: 2135.7 (16.1 mL/kg) [P.O.:900; I.V.:835.7 (6.3 mL/kg); IV Piggyback:400]  Out: 1030 (7.8 mL/kg) [Urine:1030 (0.2 mL/kg/hr)]  Weight: 132.8 kg     Relevant Results  Results for orders placed or performed during the hospital encounter of 05/14/24 (from the past 24 hour(s))   POCT GLUCOSE   Result Value Ref Range    POCT Glucose 254 (H) 74 - 99 mg/dL   POCT GLUCOSE   Result Value Ref Range    POCT Glucose 264 (H) 74 - 99 mg/dL   Light Blue Top   Result Value Ref Range    Extra Tube Hold for add-ons.    aPTT   Result Value Ref Range    aPTT 40.7 (H) 22.0 - 32.5 seconds   CBC and Auto Differential   Result Value Ref Range    WBC 5.1 4.4 - 11.3 x10*3/uL    nRBC 0.0 0.0 - 0.0 /100 WBCs    RBC 4.26 (L) 4.50 - 5.90 x10*6/uL    Hemoglobin 12.2 (L) 13.5 - 17.5 g/dL    Hematocrit 37.7 (L) 41.0 - 52.0 %    MCV 89 80 - 100 fL    MCH 28.6 26.0 - 34.0 pg    MCHC 32.4 32.0 - 36.0 g/dL    RDW 15.3 (H) 11.5 - 14.5 %    Platelets 172 150 - 450 x10*3/uL    Neutrophils % 59.0 40.0 - 80.0 %    Immature Granulocytes %, Automated 0.6 0.0 - 0.9 %    Lymphocytes % 20.7 13.0 - 44.0 %    Monocytes % 11.8 2.0 - 10.0 %    Eosinophils % 6.9 0.0 - 6.0 %    Basophils % 1.0 0.0 - 2.0 %    Neutrophils Absolute 3.00 1.20 - 7.70 x10*3/uL    Immature Granulocytes Absolute, Automated 0.03 0.00 - 0.70 x10*3/uL    Lymphocytes Absolute 1.05 (L) " 1.20 - 4.80 x10*3/uL    Monocytes Absolute 0.60 0.10 - 1.00 x10*3/uL    Eosinophils Absolute 0.35 0.00 - 0.70 x10*3/uL    Basophils Absolute 0.05 0.00 - 0.10 x10*3/uL   Basic Metabolic Panel   Result Value Ref Range    Glucose 268 (H) 65 - 99 mg/dL    Sodium 133 133 - 145 mmol/L    Potassium 4.1 3.4 - 5.1 mmol/L    Chloride 98 97 - 107 mmol/L    Bicarbonate 21 (L) 24 - 31 mmol/L    Urea Nitrogen 28 (H) 8 - 25 mg/dL    Creatinine 1.20 0.40 - 1.60 mg/dL    eGFR 72 >60 mL/min/1.73m*2    Calcium 8.7 8.5 - 10.4 mg/dL    Anion Gap 14 <=19 mmol/L   Magnesium   Result Value Ref Range    Magnesium 1.90 1.60 - 3.10 mg/dL   Phosphorus   Result Value Ref Range    Phosphorus 3.7 2.5 - 4.5 mg/dL   POCT GLUCOSE   Result Value Ref Range    POCT Glucose 253 (H) 74 - 99 mg/dL   POCT GLUCOSE   Result Value Ref Range    POCT Glucose 338 (H) 74 - 99 mg/dL     Susceptibility data from last 90 days.  Collected Specimen Info Organism Clindamycin Erythromycin Oxacillin Tetracycline Trimethoprim/Sulfamethoxazole Vancomycin   05/15/24 Tissue/Biopsy from Diabetic Foot Ulcer Methicillin Resistant Staphylococcus aureus (MRSA)  S  R  R  S  S  S     Pasteurella multocida           Mixed Anaerobic Bacteria           Mixed Gram-Positive Bacteria            No results found.    Scheduled medications  doxycycline, 100 mg, oral, q12h NOELLE  [Held by provider] furosemide, 40 mg, oral, Daily  hydrALAZINE, 5 mg, intravenous, Once  insulin glargine, 60 Units, subcutaneous, BID  insulin lispro, 0-15 Units, subcutaneous, TID  insulin lispro, 12 Units, subcutaneous, TID  mupirocin, , Topical, BID  oxygen, , inhalation, Continuous - Inhalation  pantoprazole, 40 mg, oral, Daily  valsartan, 40 mg, oral, Daily      Continuous medications  heparin, 0-4,500 Units/hr, Last Rate: 3,700 Units/hr (05/20/24 0859)      PRN medications  PRN medications: acetaminophen, ammonium lactate, carvedilol, dextrose, dextrose, glucagon, glucagon, heparin  (porcine)        ASSESSMENT:  Acute deep vein thrombosis  Acute bilateral pulmonary embolism  Acute hypoxic respiratory failure - resolved  Hypertension  Hyperlipidemia  Type 2 diabetes mellitus with hyperglycemia  Anemia chronic disease  Obesity  Chronic diabetic foot ulceration, infection MRSA, Pasteurella multocida    PLAN:  Consultations input appreciated.  IV Heparin drip in place.  Continue per protocol. Hematology/Oncology recommendations: Lovenox 120 mg every 12 hours subcutaneous x 1 month with transition to Eliquis.  Lovenox Rx sent to outpatient pharmacy today to determine cost.  Follow-up home-going discharge plan of care.  Respiratory status, pulse oximetry stable on room air.  Pulmonology following, input appreciated.  Continue to monitor.  Right foot dressing intact.  Podiatry following.  Wound, dressing care per recommendations: Betadine, calcium alginate ag, dry sterile dressing.  Outpatient follow-up with [facility] wound care center or follow-up with Dr. Fischer in 1-2 weeks.  Minimize weight bearing to the right foot*.  Stable per Podiatry.  Wound cultures reviewed.  Infectious disease following.  P.o Doxycycline total of 14 days.  Monitor temperature and white blood cell count.  P.r.n analgesics.  Tylenol.  Point of care glucose reviewed.  ADA diet.  Continue insulin.  Lantus, sliding scale.  Adjust insulin as needed for glycemic control.  Hypoglycemia protocol.  Diabetes education.  Input appreciated.  Blood pressure fairly controlled.  Review and update home medications.  Continue home medications as prescribed, as appropriate.  Diet, weight loss, exercise education.  PT/OT.  Fall precautions.  Up with assistance.  DVT treatment - IV Heparin.  GI prophylaxis.  PPI Protonix.  Anticipate transition from IV Heparin pending home-going discharge discharge medications arrangements.  Discussed with Dr. Cuadra.      Rosalva Sinha, APRN-CNP

## 2024-05-20 NOTE — CARE PLAN
The patient's goals for the shift include      The clinical goals for the shift include stable VS and therapeutic PTT    Over the shift, the patient did not make progress toward the following goals. Barriers to progression include non therapeutic PTT. Recommendations to address these barriers include monitor lab values.

## 2024-05-20 NOTE — PROGRESS NOTES
FOLLOWUP FOR: Bilateral pulmonary thromboemboli    SUBJECTIVE  Patient is continue to improve, significantly less short of breath.  On 4 L nasal cannula, sat 96%    PHYSICAL EXAM        5/19/2024    11:35 AM 5/19/2024     3:53 PM 5/19/2024     7:05 PM 5/19/2024    11:22 PM 5/20/2024     3:55 AM 5/20/2024     6:00 AM 5/20/2024     7:27 AM   Vitals   Systolic 157 137 121 132 156  117   Diastolic 66 74 52 70 60  54   Heart Rate 106 104 101 92 85  80   Temp 37.1 °C (98.8 °F) 37.1 °C (98.8 °F) 36.7 °C (98.1 °F) 37.6 °C (99.7 °F) 36.3 °C (97.3 °F)  36.2 °C (97.2 °F)   Resp 20 22 19 20 18 20   Weight (lb)      292.77    BMI      38.63 kg/m2    BSA (m2)      2.62 m2        Vital signs reviewed   NAD, awake and alert, obese   Lungs Symmetric excursions.  Auscultation - diminished but clear, no wheezing/rales/rhonchi.     Cor RSR No murmur, rub, gallop   Abd soft and nontender, no rebound or distention, no HS megaly   Ext venous stasis changes, lymphedema   Neuro no focal deficits.  moves all extremities symmetrically.  speech normal.  affect normal    LABS    Serum chemistries are unremarkable except for glucose 268.  CBC is unremarkable      ASSESSMENT:    .  Bilateral pulmonary embolism, DVT  .  Acute hypoxia  .  Obesity    PLAN    .  Continue supplemental oxygen  .  Anticoagulation per heme-onc  .  Discharge plans    Gigi Verde MD, [facility]

## 2024-05-20 NOTE — PROGRESS NOTES
Mina Huitron is a 53 y.o. male on day 6 of admission presenting with Acute pulmonary embolism, unspecified pulmonary embolism type, unspecified whether acute cor pulmonale present (Multi).    Subjective   Interval History:   Afebrile  On low-flow oxygen  Shortness of breath with exertion  No cough  No foot pain    Review of Systems   Respiratory:  Positive for shortness of breath.    All other systems reviewed and are negative.      Objective   Range of Vitals (last 24 hours)  Heart Rate:  []   Temp:  [36.2 °C (97.2 °F)-37.6 °C (99.7 °F)]   Resp:  [16-22]   BP: (117-156)/(52-74)   Weight:  [133 kg (292 lb 12.3 oz)]   SpO2:  [92 %-100 %]   Daily Weight  05/20/24 : 133 kg (292 lb 12.3 oz)    Body mass index is 38.63 kg/m².    Physical Exam  Constitutional:       Appearance: Normal appearance.   HENT:      Head: Normocephalic and atraumatic.      Right Ear: External ear normal.      Left Ear: External ear normal.      Nose: Nose normal.   Eyes:      General: No scleral icterus.     Extraocular Movements: Extraocular movements intact.      Conjunctiva/sclera: Conjunctivae normal.   Cardiovascular:      Heart sounds: Normal heart sounds, S1 normal and S2 normal.   Pulmonary:      Breath sounds: Decreased breath sounds present.   Abdominal:      General: Bowel sounds are normal.      Palpations: Abdomen is soft.   Musculoskeletal:      Cervical back: Normal range of motion and neck supple.      Right lower leg: Edema present.      Left lower leg: Edema present.      Right foot: Charcot foot present.   Feet:      Right foot:      Skin integrity: Ulcer and callus present.      Comments: Right mid plantar foot ulcer with large amount of granulation tissue  Skin:     General: Skin is warm and dry.      Comments: Right mid plantar ulcer   Neurological:      Mental Status: He is alert.   Psychiatric:         Behavior: Behavior normal. Behavior is cooperative.     Antibiotics  iohexol (OMNIPaque) 350 mg iodine/mL  solution 75 mL  heparin (porcine) injection 10,000 Units  heparin 25,000 Units in dextrose 5% 250 mL (100 Units/mL) infusion (premix)  heparin (porcine) injection 5,000-10,000 Units  oxygen (O2) therapy  heparin 25,000 Units in dextrose 5% 250 mL (100 Units/mL) infusion (premix)  glucagon (Glucagen) injection 1 mg  dextrose 50 % injection 25 g  glucagon (Glucagen) injection 1 mg  dextrose 50 % injection 12.5 g  insulin glargine (Lantus) injection 20 Units  furosemide (Lasix) tablet  insulin regular (HumuLIN R,NovoLIN R) injection 0-5 Units  perflutren lipid microspheres (Definity) injection 0.5-10 mL of dilution  sulfur hexafluoride microsphr (Lumason) injection 24.28 mg  perflutren protein A microsphere (Optison) injection 0.5 mL  furosemide (Lasix) injection 20 mg  heparin 25,000 Units in dextrose 5% 250 mL (100 Units/mL) infusion (premix)  heparin (porcine) injection 5,000-10,000 Units  magnesium sulfate IV 2 g  insulin regular (HumuLIN R,NovoLIN R) injection 0-5 Units  insulin glargine (Lantus) injection 40 Units  valsartan (Diovan) tablet  carvedilol (Coreg) tablet      furosemide (Lasix) tablet 40 mg  ammonium lactate (Lac-Hydrin) 12 % lotion  insulin regular (HumuLIN R,NovoLIN R) injection 0-10 Units  magnesium sulfate in D5W IV 1 g  insulin glargine (Lantus) injection 10 Units  insulin glargine (Lantus) injection 60 Units  valsartan (Diovan) tablet 40 mg  carvedilol (Coreg) tablet 12.5 mg  acetaminophen (Tylenol) tablet 650 mg  insulin lispro (HumaLOG) injection 0-15 Units  insulin lispro (HumaLOG) injection 5 Units  oxygen (O2) therapy  pantoprazole (ProtoNix) EC tablet 40 mg  furosemide (Lasix) injection 40 mg  hydrALAZINE (Apresoline) injection 5 mg  potassium chloride 20 mEq in 100 mL IV premix  magnesium sulfate IV 2 g  magnesium sulfate in D5W IV 1 g  oxygen (O2) therapy  oxygen (O2) therapy  insulin glargine (Lantus) injection 80 Units  insulin lispro (HumaLOG) injection 7 Units  mupirocin (Bactroban)  "2 % ointment  vancomycin (Vancocin) pharmacy to dose - pharmacy monitoring  piperacillin-tazobactam-dextrose (Zosyn) IV 4.5 g  vancomycin 1.5 g in 300 mL (Xellia) IVPB 1.5 g  oxygen (O2) therapy  vancomycin (Xellia) 1 g in 200 mL (Xellia) IVPB 1 g  insulin glargine (Lantus) injection 60 Units  insulin lispro (HumaLOG) injection 12 Units  doxycycline (Vibramycin) capsule 100 mg      Relevant Results  Labs  Results from last 72 hours   Lab Units 05/20/24  0431 05/19/24  0429 05/18/24  0521   WBC AUTO x10*3/uL 5.1 7.8 10.2   HEMOGLOBIN g/dL 12.2* 13.2* 14.9   HEMATOCRIT % 37.7* 41.0 45.5   PLATELETS AUTO x10*3/uL 172 177 259   NEUTROS PCT AUTO % 59.0 83.1 67.1   LYMPHS PCT AUTO % 20.7 9.6 22.3   MONOS PCT AUTO % 11.8 6.0 7.6   EOS PCT AUTO % 6.9 0.4 1.9     Results from last 72 hours   Lab Units 05/20/24  0431 05/19/24  0429 05/18/24  0521   SODIUM mmol/L 133 131* 136   POTASSIUM mmol/L 4.1 4.2 3.8   CHLORIDE mmol/L 98 94* 98   CO2 mmol/L 21* 23* 26   BUN mg/dL 28* 24 17   CREATININE mg/dL 1.20 1.40 1.00   GLUCOSE mg/dL 268* 315* 275*   CALCIUM mg/dL 8.7 8.8 9.5   ANION GAP mmol/L 14 14 12   EGFR mL/min/1.73m*2 72 60* 90   PHOSPHORUS mg/dL 3.7 3.7 3.8         Estimated Creatinine Clearance: 101.7 mL/min (by C-G formula based on SCr of 1.2 mg/dL).  No results found for: \"CRP\"  Microbiology  Susceptibility data from last 14 days.  Collected Specimen Info Organism Clindamycin Erythromycin Oxacillin Tetracycline Trimethoprim/Sulfamethoxazole Vancomycin   05/15/24 Tissue/Biopsy from Diabetic Foot Ulcer Methicillin Resistant Staphylococcus aureus (MRSA)  S  R  R  S  S  S     Pasteurella multocida           Mixed Anaerobic Bacteria           Mixed Gram-Positive Bacteria            Imaging  XR chest 1 view    Result Date: 5/17/2024  Interpreted By:  Pattie Snyder, STUDY: XR CHEST 1 VIEW 5/17/2024 4:11 pm   INDICATION: Signs/Symptoms:Hypoxia   COMPARISON: None available.   ACCESSION NUMBER(S): [id number]   ORDERING CLINICIAN: " LUX DILL   TECHNIQUE: AP erect view of the chest   FINDINGS: The cardiac size is within normal limits. The lungs are clear. No pleural abnormality is seen. There are mild degenerative changes of the thoracic spine.       No acute cardiopulmonary disease.   Signed by: Pattie Snyder 5/17/2024 4:21 PM Dictation workstation:   [id number]    Transthoracic Echo (TTE) Complete    Addendum Date: 5/16/2024    RV is mild to moderately dilated and RV systolic function is mildly reduced. [id number] Ricardo Cortez MD Electronically Amended 5/16/2024, 12:47 PM  Final (Amended)     Result Date: 5/16/2024           [place] [address]            Phone [phone] TRANSTHORACIC ECHOCARDIOGRAM REPORT  Patient Name:      JAYLENE Ortega Physician:    [id number] Ricardo Cortez MD Study Date:        5/15/2024             Ordering Provider:    [id number] ANDRADE MRN/PID:           [id number]              Fellow: Accession#:        [id number]          Nurse: Date of Birth/Age: 1970 / 53 years  Sonographer:          Tanner Delgadillo RDCS Gender:            M                     Additional Staff: Height:            182.00 cm             Admit Date: Weight:            139.00 kg             Admission Status:     Inpatient -                                                                Routine BSA / BMI:         2.55 m2 / 41.96 kg/m2 Department Location:  [facility] Blood Pressure: 152 /77 mmHg Study Type:    TRANSTHORACIC ECHO (TTE) COMPLETE Diagnosis/ICD: Chronic pulmonary embolism-I27.82 Indication:    PE with Edema CPT Codes:     Echo Complete w Full [id number] Patient History: Pertinent History: HTN, Hyperlipidemia, LE Edema, Dyspnea and Syncope. PE,                    Elevated  Troponin. Study Detail: The following Echo studies were performed: 2D, M-Mode, Doppler and               color flow. Technically challenging study due to poor acoustic               windows. Definity used as a contrast agent for endocardial border               definition. Total contrast used for this procedure was 1 mL via IV               push.  PHYSICIAN INTERPRETATION: Left Ventricle: Left ventricular systolic function is normal, with an estimated ejection fraction of 60%. There are no regional wall motion abnormalities. The left ventricular cavity size is normal. Spectral Doppler shows an impaired relaxation pattern of left ventricular diastolic filling. Left Atrium: The left atrium is normal in size. Right Ventricle: The right ventricle is normal in size. There is normal right ventricular global systolic function. Right Atrium: The right atrium is normal in size. Aortic Valve: The aortic valve is trileaflet. There is no evidence of aortic valve regurgitation. The peak instantaneous gradient of the aortic valve is 4.4 mmHg. The mean gradient of the aortic valve is 2.0 mmHg. Mitral Valve: The mitral valve is normal in structure. There is trace mitral valve regurgitation. Tricuspid Valve: The tricuspid valve is structurally normal. There is physiological tricuspid regurgitation. The right ventricular systolic pressure is unable to be estimated. Pulmonic Valve: The pulmonic valve is not well visualized. The pulmonic valve regurgitation was not well visualized. Pericardium: There is no pericardial effusion noted. Aorta: The aortic root is normal.  CONCLUSIONS:  1. Left ventricular systolic function is normal with a 60% estimated ejection fraction.  2. Spectral Doppler shows an impaired relaxation pattern of left ventricular diastolic filling.  3. Trace mitral valve regurgitation.  4. Physiological tricuspid regurgitation. QUANTITATIVE DATA SUMMARY: 2D MEASUREMENTS:                          Normal Ranges: LAs:            3.60 cm   (2.7-4.0cm) IVSd:          0.92 cm   (0.6-1.1cm) LVPWd:         0.79 cm   (0.6-1.1cm) LVIDd:         4.67 cm   (3.9-5.9cm) LVIDs:         3.98 cm LV Mass Index: 51.8 g/m2 LV % FS        14.8 % LA VOLUME:                               Normal Ranges: LA Vol A4C:        58.6 ml    (22+/-6mL/m2) LA Vol A2C:        62.2 ml LA Vol BP:         61.0 ml LA Vol Index A4C:  23.0ml/m2 LA Vol Index A2C:  24.5 ml/m2 LA Vol Index BP:   24.0 ml/m2 LA Area A4C:       19.3 cm2 LA Area A2C:       19.7 cm2 LA Major Axis A4C: 5.4 cm LA Major Axis A2C: 5.3 cm LA Volume Index:   24.0 ml/m2 LA Vol A4C:        55.0 ml LA Vol A2C:        60.0 ml LV SYSTOLIC FUNCTION BY 2D PLANIMETRY (MOD):                     Normal Ranges: EF-A4C View: 60.5 % (>=55%) EF-A2C View: 63.6 % EF-Biplane:  61.0 % LV DIASTOLIC FUNCTION:                        Normal Ranges: MV Peak E:    0.84 m/s (0.7-1.2 m/s) MV Peak A:    0.93 m/s (0.42-0.7 m/s) E/A Ratio:    0.90     (1.0-2.2) MV lateral e' 0.07 m/s MV medial e'  0.05 m/s MITRAL VALVE:                 Normal Ranges: MV DT: 205 msec (150-240msec) AORTIC VALVE:                                   Normal Ranges: AoV Vmax:                1.05 m/s (<=1.7m/s) AoV Peak P.4 mmHg (<20mmHg) AoV Mean P.0 mmHg (1.7-11.5mmHg) LVOT Max Robin:            0.95 m/s (<=1.1m/s) AoV VTI:                 17.50 cm (18-25cm) LVOT VTI:                14.90 cm LVOT Diameter:           2.00 cm  (1.8-2.4cm) AoV Area, VTI:           2.67 cm2 (2.5-5.5cm2) AoV Area,Vmax:           2.83 cm2 (2.5-4.5cm2) AoV Dimensionless Index: 0.85  RIGHT VENTRICLE: RV Basal 3.81 cm RV Mid   3.95 cm RV Major 8.3 cm TAPSE:   25.9 mm RV s'    0.09 m/s TRICUSPID VALVE/RVSP:                   Normal Ranges: IVC Diam: 2.09 cm PULMONIC VALVE:                      Normal Ranges: PV Max Robin: 0.8 m/s  (0.6-0.9m/s) PV Max P.4 mmHg  [id number] Ricardo Cortez MD Electronically signed on 2024 at 12:44:56 PM  ** Final **      XR foot right 3+ views    Result Date: 5/16/2024  Interpreted By:  Raegan Larson, STUDY: XR FOOT RIGHT 3+ VIEWS 5/15/2024 5:22 pm   INDICATION: Signs/Symptoms:chronic right foot ulcer plantrar   COMPARISON: None available.   ACCESSION NUMBER(S): [id number]   ORDERING CLINICIAN: JAYLENE BANEGAS   TECHNIQUE: AP, lateral common oblique views of the right foot   FINDINGS: Hindfoot osseous structures demonstrate large calcaneal spurring. There is collapse of the talus with component of disorganized architecture and sequela of remote fracture and fragmentation. Of note, there is osseous fusion across the intertarsal articulations and sequela of Charcot arthropathy with fusion across the Lisfranc joint. There is persistent visualization of the calcaneocuboid articulation with eburnation noted as well as in the distribution of the talonavicular articulation. No acute fracture line within the visualized portions of the hindfoot within limits of this plain film. Forefoot metatarsal shafts demonstrate no evidence for fracture. There is postop operative resection 4th digit at the MTP joint. 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 Digits distally demonstrate no evidence for erosions.             1. Osseous fusion across the tarsal and tarsometatarsal articulations with sequela of Charcot arthropathy. Persistent visualization of the calcaneocuboid and talonavicular articulations   2. Skin ulceration plantar margin of the midfoot. However, no cortical erosion by plain film.   Signed by: Raegan Larson 5/16/2024 9:36 AM Dictation workstation:   [id number]    ECG 12 lead    Result Date: 5/15/2024  Sinus tachycardia Otherwise normal ECG No previous ECGs available Confirmed by Kole Durand ([id number]) on 5/15/2024 12:16:24 PM    Vascular US lower extremity venous duplex bilateral    Result Date: 5/14/2024  Interpreted By:  Phil Tejeda, STUDY: VASC US LOWER EXTREMITY VENOUS DUPLEX BILATERAL  5/14/2024 5:10 pm   INDICATION: 54 y/o    M with  Signs/Symptoms:bilateral PEs. LMP:  Unknown.   COMPARISON: None.   ACCESSION NUMBER(S): [id number]   ORDERING CLINICIAN: NICKOLAS BOYKIN   TECHNIQUE: Routine ultrasound of the  bilateral lower extremities was performed with duplex Doppler (color and spectral) evaluation.   Static images were obtained for remote interpretation.     FINDINGS: Right lower extremity: All visualized deep veins in the right lower extremity are patent with no thrombosis. The veins are compressible and demonstrate normal augmentation.   Left lower extremity: The junction of the greater saphenous and common femoral veins is not compressible, which may be due to nonocclusive thrombosis. All other deep veins in the femoropopliteal segment of the left lower extremity are patent and compressible with no evidence of thrombosis. The left calf veins could not be identified.       Questionable nonocclusive thrombosis at the junction of the left greater saphenous vein and common femoral vein.   No DVT in the right lower extremity.   MACRO: None   Signed by: Phil Tejeda 5/14/2024 5:49 PM Dictation workstation:   [id number]    CT angio chest for pulmonary embolism    Result Date: 5/14/2024  Interpreted By:  Cameron Navarro, STUDY: CT ANGIO CHEST FOR PULMONARY EMBOLISM; 5/14/2024 11:16 am   INDICATION: Signs/Symptoms:short of breath, tachycardic, history of PE.   COMPARISON: None   ACCESSION NUMBER(S): [id number]   ORDERING CLINICIAN: NICKOLAS BOYKIN   TECHNIQUE: Contiguous axial images of the thorax were obtained from the level of the thoracic inlet through the lung bases. 3-D MIPS were created, processed and reviewed on a separate workstation. 100 ml of Omnipaque 350 was utilized. All CT examinations are performed with 1 or more of the following dose reduction techniques: Automated exposure control, adjustment of mA and/or kv according to patient's size, or use of iterative reconstruction techniques.   FINDINGS: The thyroid gland  is unremarkable.   There is adequate contrast opacification of the pulmonary arterial vasculature. Extensive filling defects consistent with pulmonary emboli are seen bilaterally. There is prominent pulmonary embolus at the distal main right and distal main left pulmonary emboli with extensive thrombus extending throughout the majority of segmental and subsegmental branches bilaterally. However, no evidence for right heart strain or significant septal deviation.   The heart size is within normal limits.  No pericardial effusion is identified. The aorta and pulmonary arteries are normal in caliber.   There are no pathologically enlarged mediastinal, hilar, or axillary lymph nodes are seen.   The trachea and mainstem bronchi are patent. No suspicious pulmonary nodules are seen. No significant consolidation. There is no evidence of pneumothorax or pleural effusion.   The visualized osseous structures are intact.   Limited images through the upper abdomen are unremarkable.       1. (+) PE.  Extensive bilateral pulmonary emboli. No evidence of right heart strain. 2. No significant airspace opacity or consolidation.     Signed by: Cameron Navarro 5/14/2024 11:49 AM Dictation workstation:   [id number]     Assessment/Plan   Acute respiratory failure secondary to pulmonary embolism  Bilateral pulmonary embolism  Type 2 diabetes with peripheral angiopathy without gangrene  Right diabetic foot ulcer, Gan 2  Polymicrobial infection of right mid plantar ulcer-MRSA and Pasteurella  Right foot Charcot deformity  Resolving GARTH        Oxygen as needed  Anticoagulation  Doxycycline to complete total of 14 days-5/31/2024-covers both MRSA and Pasteurella  Local care  Offloading   Monitor temperature and WBC    Andrea June MD

## 2024-05-20 NOTE — CARE PLAN
The patient's goals for the shift include      The clinical goals for the shift include wean O2    Over the shift, the patient did  make progress toward the following goals.   Problem: Respiratory  Goal: Minimal/no exertional discomfort or dyspnea this shift  Outcome: Progressing

## 2024-05-20 NOTE — DISCHARGE INSTRUCTIONS
Podiatry discharge instructions:L  Right foot wound culture resulting S. Aureus and Pasturella multocida. ID recs PO doxycycline 100 mg Po BID x 14 days. Take as directed.  Dressing changes with betadine, calcium alginate ag, gauze wrap, ace bandage for compression.    Mina Fischer DPM    If you have any questions, please contact Dr. Cuadra's office at [phone].

## 2024-05-20 NOTE — PROGRESS NOTES
"Mina Huitron is a 53 y.o. male on day 6 of admission presenting with Acute pulmonary embolism, unspecified pulmonary embolism type, unspecified whether acute cor pulmonale present (Multi).    Subjective   Patient seen bedside this morning. Feeling better.  Currently sitting in bed without oxygen.  States he removed it on his own to see how he feels.  Denies any shortness of breath currently.    Physical Exam    Objective     Objective: Patient seen bedside. 460-A.  Aware alert and oriented x 3.        Vasc: DP and PT pulses are faintly palpable bilateral.  Remarkable +2 pitting edema to bilateral lower extremities.  There is evidence of chronic hemosiderin deposition secondary to venous insufficiency.  Skin temperature is warm to cool proximal to distal bilateral.       Neuro:  Light touch is intact to the foot bilateral.  Protective sensation is completely absent to the foot when tested with the 5.07 SWM bilateral.       Derm: Right foot plantar central midfoot with large roughly 3.0 cm x 1.6 cm x 0.6 cm ulceration.  Fibrogranular base.  mild hyperkeratotic buildup.  No Malodor.  There is some discernible depth and undermining as well as probing although not to the level of bone.  No purulent drainage.  No abscess formation.     Ortho: Charcot neuroarthropathy with midfoot breakdown and rocker-bottom foot deformity to the right foot.  Loss of fourth digit right foot.       Last Recorded Vitals  Blood pressure 117/54, pulse 80, temperature 36.2 °C (97.2 °F), temperature source Temporal, resp. rate 20, height 1.854 m (6' 1\"), weight 133 kg (292 lb 12.3 oz), SpO2 96%.    Intake/Output last 3 Shifts:  I/O last 3 completed shifts:  In: 2135.7 (16.1 mL/kg) [P.O.:900; I.V.:835.7 (6.3 mL/kg); IV Piggyback:400]  Out: 1030 (7.8 mL/kg) [Urine:1030 (0.2 mL/kg/hr)]  Weight: 132.8 kg     Relevant Results           Assessment/Plan   Principal Problem:    Acute pulmonary embolism, unspecified pulmonary embolism type, " unspecified whether acute cor pulmonale present (Multi)    #chronic diabetic foot ulceration, right foot in the setting of Charcot neuroarthropathy  -right foot wound culture resulting S. Aureus and Pasturella multocida. ID recs PO doxycycline 100 mg Po BID x 14 days    Dressing changes with betadine, calcium alginate ag, DSD.    Recommending follow up with [facility] wound care center vs my office in 1-2 weeks. Minimize WB until then to right foot.      Ok for discharge from my standpoint     I spent 30 minutes in the professional and overall care of this patient.          Mina Fischer, FEDE

## 2024-05-20 NOTE — CARE PLAN
The patient's goals for the shift include      The clinical goals for the shift include Maintain patient safety      Problem: Respiratory  Goal: Clear secretions with interventions this shift  Outcome: Progressing  Goal: Minimize anxiety/maximize coping throughout shift  Outcome: Progressing  Goal: Minimal/no exertional discomfort or dyspnea this shift  Outcome: Progressing  Goal: No signs of respiratory distress (eg. Use of accessory muscles. Peds grunting)  Outcome: Progressing  Goal: Patent airway maintained this shift  Outcome: Progressing  Goal: Tolerate mechanical ventilation evidenced by VS/agitation level this shift  Outcome: Progressing  Goal: Tolerate pulmonary toileting this shift  Outcome: Progressing  Goal: Verbalize decreased shortness of breath this shift  Outcome: Progressing  Goal: Wean oxygen to maintain O2 saturation per order/standard this shift  Outcome: Progressing  Goal: Increase self care and/or family involvement in next 24 hours  Outcome: Progressing

## 2024-05-20 NOTE — NURSING NOTE
Medicated for a headache, continues on a heparin drip.  Preparing for discharge.  Will continue to monitor patient.

## 2024-05-20 NOTE — PROGRESS NOTES
Pt is over income for medicaid.     05/20/24 2284   Discharge Planning   Patient expects to be discharged to: Home

## 2024-05-21 ENCOUNTER — PHARMACY VISIT (OUTPATIENT)
Dept: PHARMACY | Facility: CLINIC | Age: 54
End: 2024-05-21
Payer: COMMERCIAL

## 2024-05-21 VITALS
HEIGHT: 73 IN | RESPIRATION RATE: 19 BRPM | WEIGHT: 304.01 LBS | HEART RATE: 81 BPM | TEMPERATURE: 97.5 F | SYSTOLIC BLOOD PRESSURE: 140 MMHG | BODY MASS INDEX: 40.29 KG/M2 | DIASTOLIC BLOOD PRESSURE: 60 MMHG | OXYGEN SATURATION: 95 %

## 2024-05-21 LAB
ANION GAP SERPL CALC-SCNC: 11 MMOL/L
APTT PPP: >139 SECONDS (ref 22–32.5)
BASOPHILS # BLD AUTO: 0.03 X10*3/UL (ref 0–0.1)
BASOPHILS NFR BLD AUTO: 0.6 %
BUN SERPL-MCNC: 34 MG/DL (ref 8–25)
CALCIUM SERPL-MCNC: 8.4 MG/DL (ref 8.5–10.4)
CHLORIDE SERPL-SCNC: 101 MMOL/L (ref 97–107)
CO2 SERPL-SCNC: 23 MMOL/L (ref 24–31)
CREAT SERPL-MCNC: 1.3 MG/DL (ref 0.4–1.6)
EGFRCR SERPLBLD CKD-EPI 2021: 66 ML/MIN/1.73M*2
EOSINOPHIL # BLD AUTO: 0.39 X10*3/UL (ref 0–0.7)
EOSINOPHIL NFR BLD AUTO: 7.9 %
ERYTHROCYTE [DISTWIDTH] IN BLOOD BY AUTOMATED COUNT: 15 % (ref 11.5–14.5)
GLUCOSE BLD MANUAL STRIP-MCNC: 164 MG/DL (ref 74–99)
GLUCOSE BLD MANUAL STRIP-MCNC: 228 MG/DL (ref 74–99)
GLUCOSE SERPL-MCNC: 148 MG/DL (ref 65–99)
HCT VFR BLD AUTO: 36.3 % (ref 41–52)
HGB BLD-MCNC: 11.4 G/DL (ref 13.5–17.5)
IMM GRANULOCYTES # BLD AUTO: 0.02 X10*3/UL (ref 0–0.7)
IMM GRANULOCYTES NFR BLD AUTO: 0.4 % (ref 0–0.9)
LYMPHOCYTES # BLD AUTO: 1.55 X10*3/UL (ref 1.2–4.8)
LYMPHOCYTES NFR BLD AUTO: 31.4 %
MAGNESIUM SERPL-MCNC: 1.9 MG/DL (ref 1.6–3.1)
MCH RBC QN AUTO: 28.5 PG (ref 26–34)
MCHC RBC AUTO-ENTMCNC: 31.4 G/DL (ref 32–36)
MCV RBC AUTO: 91 FL (ref 80–100)
MONOCYTES # BLD AUTO: 0.56 X10*3/UL (ref 0.1–1)
MONOCYTES NFR BLD AUTO: 11.4 %
NEUTROPHILS # BLD AUTO: 2.38 X10*3/UL (ref 1.2–7.7)
NEUTROPHILS NFR BLD AUTO: 48.3 %
NRBC BLD-RTO: 0 /100 WBCS (ref 0–0)
PHOSPHATE SERPL-MCNC: 4 MG/DL (ref 2.5–4.5)
PLATELET # BLD AUTO: 168 X10*3/UL (ref 150–450)
POTASSIUM SERPL-SCNC: 4.2 MMOL/L (ref 3.4–5.1)
RBC # BLD AUTO: 4 X10*6/UL (ref 4.5–5.9)
SODIUM SERPL-SCNC: 135 MMOL/L (ref 133–145)
WBC # BLD AUTO: 4.9 X10*3/UL (ref 4.4–11.3)

## 2024-05-21 PROCEDURE — 80048 BASIC METABOLIC PNL TOTAL CA: CPT | Performed by: INTERNAL MEDICINE

## 2024-05-21 PROCEDURE — 2500000001 HC RX 250 WO HCPCS SELF ADMINISTERED DRUGS (ALT 637 FOR MEDICARE OP): Performed by: INTERNAL MEDICINE

## 2024-05-21 PROCEDURE — RXMED WILLOW AMBULATORY MEDICATION CHARGE

## 2024-05-21 PROCEDURE — 85025 COMPLETE CBC W/AUTO DIFF WBC: CPT | Performed by: INTERNAL MEDICINE

## 2024-05-21 PROCEDURE — 82947 ASSAY GLUCOSE BLOOD QUANT: CPT

## 2024-05-21 PROCEDURE — 84100 ASSAY OF PHOSPHORUS: CPT | Performed by: INTERNAL MEDICINE

## 2024-05-21 PROCEDURE — 36415 COLL VENOUS BLD VENIPUNCTURE: CPT | Performed by: INTERNAL MEDICINE

## 2024-05-21 PROCEDURE — 2500000004 HC RX 250 GENERAL PHARMACY W/ HCPCS (ALT 636 FOR OP/ED): Performed by: INTERNAL MEDICINE

## 2024-05-21 PROCEDURE — 85730 THROMBOPLASTIN TIME PARTIAL: CPT | Performed by: INTERNAL MEDICINE

## 2024-05-21 PROCEDURE — 2500000005 HC RX 250 GENERAL PHARMACY W/O HCPCS: Performed by: INTERNAL MEDICINE

## 2024-05-21 PROCEDURE — 2500000001 HC RX 250 WO HCPCS SELF ADMINISTERED DRUGS (ALT 637 FOR MEDICARE OP): Performed by: NURSE PRACTITIONER

## 2024-05-21 PROCEDURE — 2500000002 HC RX 250 W HCPCS SELF ADMINISTERED DRUGS (ALT 637 FOR MEDICARE OP, ALT 636 FOR OP/ED): Performed by: INTERNAL MEDICINE

## 2024-05-21 PROCEDURE — 83735 ASSAY OF MAGNESIUM: CPT | Performed by: INTERNAL MEDICINE

## 2024-05-21 RX ORDER — CARVEDILOL 12.5 MG/1
12.5 TABLET ORAL
Start: 2024-05-21

## 2024-05-21 RX ORDER — DOXYCYCLINE 100 MG/1
100 CAPSULE ORAL EVERY 12 HOURS SCHEDULED
Qty: 20 CAPSULE | Refills: 0 | Status: SHIPPED | OUTPATIENT
Start: 2024-05-21 | End: 2024-05-31

## 2024-05-21 RX ORDER — FUROSEMIDE 20 MG/1
20 TABLET ORAL DAILY
Status: DISCONTINUED | OUTPATIENT
Start: 2024-05-21 | End: 2024-05-21 | Stop reason: HOSPADM

## 2024-05-21 RX ORDER — CARVEDILOL 12.5 MG/1
12.5 TABLET ORAL
Status: DISCONTINUED | OUTPATIENT
Start: 2024-05-21 | End: 2024-05-21 | Stop reason: HOSPADM

## 2024-05-21 RX ORDER — ACETAMINOPHEN 325 MG/1
650 TABLET ORAL EVERY 6 HOURS PRN
Start: 2024-05-21

## 2024-05-21 RX ORDER — OMEPRAZOLE 20 MG/1
20 CAPSULE, DELAYED RELEASE ORAL DAILY
Qty: 30 CAPSULE | Refills: 0 | Status: SHIPPED | OUTPATIENT
Start: 2024-05-21

## 2024-05-21 RX ORDER — AMMONIUM LACTATE 12 G/100G
LOTION TOPICAL
Start: 2024-05-21

## 2024-05-21 RX ADMIN — MUPIROCIN: 20 OINTMENT TOPICAL at 09:00

## 2024-05-21 RX ADMIN — INSULIN LISPRO 12 UNITS: 100 INJECTION, SOLUTION INTRAVENOUS; SUBCUTANEOUS at 08:00

## 2024-05-21 RX ADMIN — INSULIN LISPRO 6 UNITS: 100 INJECTION, SOLUTION INTRAVENOUS; SUBCUTANEOUS at 12:33

## 2024-05-21 RX ADMIN — DOXYCYCLINE HYCLATE 100 MG: 100 CAPSULE ORAL at 09:00

## 2024-05-21 RX ADMIN — INSULIN GLARGINE 60 UNITS: 100 INJECTION, SOLUTION SUBCUTANEOUS at 09:00

## 2024-05-21 RX ADMIN — Medication 21 PERCENT: at 08:00

## 2024-05-21 RX ADMIN — INSULIN LISPRO 3 UNITS: 100 INJECTION, SOLUTION INTRAVENOUS; SUBCUTANEOUS at 09:00

## 2024-05-21 RX ADMIN — HEPARIN SODIUM 3000 UNITS/HR: 10000 INJECTION, SOLUTION INTRAVENOUS at 09:00

## 2024-05-21 RX ADMIN — PANTOPRAZOLE SODIUM 40 MG: 40 TABLET, DELAYED RELEASE ORAL at 09:00

## 2024-05-21 RX ADMIN — GUAIFENESIN 600 MG: 600 TABLET ORAL at 09:51

## 2024-05-21 RX ADMIN — APIXABAN 10 MG: 5 TABLET, FILM COATED ORAL at 13:43

## 2024-05-21 RX ADMIN — INSULIN LISPRO 12 UNITS: 100 INJECTION, SOLUTION INTRAVENOUS; SUBCUTANEOUS at 12:00

## 2024-05-21 ASSESSMENT — COGNITIVE AND FUNCTIONAL STATUS - GENERAL
DAILY ACTIVITIY SCORE: 24
MOBILITY SCORE: 24

## 2024-05-21 ASSESSMENT — PAIN - FUNCTIONAL ASSESSMENT
PAIN_FUNCTIONAL_ASSESSMENT: 0-10
PAIN_FUNCTIONAL_ASSESSMENT: 0-10

## 2024-05-21 ASSESSMENT — PAIN SCALES - GENERAL
PAINLEVEL_OUTOF10: 0 - NO PAIN
PAINLEVEL_OUTOF10: 0 - NO PAIN

## 2024-05-21 NOTE — PROGRESS NOTES
Mina Huitron is a 53 y.o. male on day 7 of admission presenting with Acute pulmonary embolism, unspecified pulmonary embolism type, unspecified whether acute cor pulmonale present (Multi).    Subjective   Interval History:   Afebrile, no chills  No foot pain  No chest pain or shortness of breath  No nausea vomiting or diarrhea  On room air  Review of Systems   All other systems reviewed and are negative.      Objective   Range of Vitals (last 24 hours)  Heart Rate:  [76-86]   Temp:  [36.3 °C (97.3 °F)-36.6 °C (97.9 °F)]   Resp:  [16-22]   BP: (103-148)/(55-73)   Weight:  [138 kg (304 lb 0.2 oz)]   SpO2:  [92 %-100 %]   Daily Weight  05/21/24 : 138 kg (304 lb 0.2 oz)    Body mass index is 40.11 kg/m².    Physical Exam  Constitutional:       Appearance: Normal appearance.   HENT:      Head: Normocephalic and atraumatic.      Right Ear: External ear normal.      Left Ear: External ear normal.      Nose: Nose normal.   Eyes:      General: No scleral icterus.     Extraocular Movements: Extraocular movements intact.      Conjunctiva/sclera: Conjunctivae normal.   Cardiovascular:      Heart sounds: Normal heart sounds, S1 normal and S2 normal.   Pulmonary:      Breath sounds: Decreased breath sounds present.   Abdominal:      General: Bowel sounds are normal.      Palpations: Abdomen is soft.   Musculoskeletal:      Cervical back: Normal range of motion and neck supple.      Right lower leg: Edema present.      Left lower leg: Edema present.      Right foot: Charcot foot present.   Feet:      Right foot:      Skin integrity: Ulcer and callus present.      Comments: Right mid plantar foot ulcer with large amount of granulation tissue  Skin:     General: Skin is warm and dry.      Comments: Right mid plantar ulcer   Neurological:      Mental Status: He is alert.   Psychiatric:         Behavior: Behavior normal. Behavior is cooperative.     Antibiotics  iohexol (OMNIPaque) 350 mg iodine/mL solution 75 mL  heparin (porcine)  injection 10,000 Units  heparin 25,000 Units in dextrose 5% 250 mL (100 Units/mL) infusion (premix)  heparin (porcine) injection 5,000-10,000 Units  oxygen (O2) therapy  heparin 25,000 Units in dextrose 5% 250 mL (100 Units/mL) infusion (premix)  glucagon (Glucagen) injection 1 mg  dextrose 50 % injection 25 g  glucagon (Glucagen) injection 1 mg  dextrose 50 % injection 12.5 g  insulin glargine (Lantus) injection 20 Units  furosemide (Lasix) tablet  insulin regular (HumuLIN R,NovoLIN R) injection 0-5 Units  perflutren lipid microspheres (Definity) injection 0.5-10 mL of dilution  sulfur hexafluoride microsphr (Lumason) injection 24.28 mg  perflutren protein A microsphere (Optison) injection 0.5 mL  furosemide (Lasix) injection 20 mg  heparin 25,000 Units in dextrose 5% 250 mL (100 Units/mL) infusion (premix)  heparin (porcine) injection 5,000-10,000 Units  magnesium sulfate IV 2 g  insulin regular (HumuLIN R,NovoLIN R) injection 0-5 Units  insulin glargine (Lantus) injection 40 Units  valsartan (Diovan) tablet  carvedilol (Coreg) tablet      furosemide (Lasix) tablet 40 mg  ammonium lactate (Lac-Hydrin) 12 % lotion  insulin regular (HumuLIN R,NovoLIN R) injection 0-10 Units  magnesium sulfate in D5W IV 1 g  insulin glargine (Lantus) injection 10 Units  insulin glargine (Lantus) injection 60 Units  valsartan (Diovan) tablet 40 mg  carvedilol (Coreg) tablet 12.5 mg  acetaminophen (Tylenol) tablet 650 mg  insulin lispro (HumaLOG) injection 0-15 Units  insulin lispro (HumaLOG) injection 5 Units  oxygen (O2) therapy  pantoprazole (ProtoNix) EC tablet 40 mg  furosemide (Lasix) injection 40 mg  hydrALAZINE (Apresoline) injection 5 mg  potassium chloride 20 mEq in 100 mL IV premix  magnesium sulfate IV 2 g  magnesium sulfate in D5W IV 1 g  oxygen (O2) therapy  oxygen (O2) therapy  insulin glargine (Lantus) injection 80 Units  insulin lispro (HumaLOG) injection 7 Units  mupirocin (Bactroban) 2 % ointment  vancomycin  "(Vancocin) pharmacy to dose - pharmacy monitoring  piperacillin-tazobactam-dextrose (Zosyn) IV 4.5 g  vancomycin 1.5 g in 300 mL (Xellia) IVPB 1.5 g  oxygen (O2) therapy  vancomycin (Xellia) 1 g in 200 mL (Xellia) IVPB 1 g  insulin glargine (Lantus) injection 60 Units  insulin lispro (HumaLOG) injection 12 Units  doxycycline (Vibramycin) capsule 100 mg    guaiFENesin (Mucinex) 12 hr tablet 600 mg      Relevant Results  Labs  Results from last 72 hours   Lab Units 05/21/24  0556 05/20/24  0431 05/19/24  0429   WBC AUTO x10*3/uL 4.9 5.1 7.8   HEMOGLOBIN g/dL 11.4* 12.2* 13.2*   HEMATOCRIT % 36.3* 37.7* 41.0   PLATELETS AUTO x10*3/uL 168 172 177   NEUTROS PCT AUTO % 48.3 59.0 83.1   LYMPHS PCT AUTO % 31.4 20.7 9.6   MONOS PCT AUTO % 11.4 11.8 6.0   EOS PCT AUTO % 7.9 6.9 0.4     Results from last 72 hours   Lab Units 05/21/24  0557 05/20/24  0431 05/19/24  0429   SODIUM mmol/L 135 133 131*   POTASSIUM mmol/L 4.2 4.1 4.2   CHLORIDE mmol/L 101 98 94*   CO2 mmol/L 23* 21* 23*   BUN mg/dL 34* 28* 24   CREATININE mg/dL 1.30 1.20 1.40   GLUCOSE mg/dL 148* 268* 315*   CALCIUM mg/dL 8.4* 8.7 8.8   ANION GAP mmol/L 11 14 14   EGFR mL/min/1.73m*2 66 72 60*   PHOSPHORUS mg/dL 4.0 3.7 3.7         Estimated Creatinine Clearance: 95.7 mL/min (by C-G formula based on SCr of 1.3 mg/dL).  No results found for: \"CRP\"  Microbiology  Susceptibility data from last 14 days.  Collected Specimen Info Organism Clindamycin Erythromycin Oxacillin Tetracycline Trimethoprim/Sulfamethoxazole Vancomycin   05/15/24 Tissue/Biopsy from Diabetic Foot Ulcer Methicillin Resistant Staphylococcus aureus (MRSA)  S  R  R  S  S  S     Pasteurella multocida           Mixed Anaerobic Bacteria           Mixed Gram-Positive Bacteria            Imaging  XR chest 1 view    Result Date: 5/17/2024  Interpreted By:  Pattie Snyder, STUDY: XR CHEST 1 VIEW 5/17/2024 4:11 pm   INDICATION: Signs/Symptoms:Hypoxia   COMPARISON: None available.   ACCESSION NUMBER(S): " [id number]   ORDERING CLINICIAN: LUX DILL   TECHNIQUE: AP erect view of the chest   FINDINGS: The cardiac size is within normal limits. The lungs are clear. No pleural abnormality is seen. There are mild degenerative changes of the thoracic spine.       No acute cardiopulmonary disease.   Signed by: Pattie Snyder 5/17/2024 4:21 PM Dictation workstation:   [id number]    Transthoracic Echo (TTE) Complete    Addendum Date: 5/16/2024    RV is mild to moderately dilated and RV systolic function is mildly reduced. [id number] Ricardo Cortez MD Electronically Amended 5/16/2024, 12:47 PM  Final (Amended)     Result Date: 5/16/2024           [place] [address]            Phone [phone] TRANSTHORACIC ECHOCARDIOGRAM REPORT  Patient Name:      JAYLENE NOVAK VARELA       Reading Physician:    [id number] Ricardo Cortez MD Study Date:        5/15/2024             Ordering Provider:    [id number] ANDRADE MRN/PID:           [id number]              Fellow: Accession#:        [id number]          Nurse: Date of Birth/Age: 1970 / 53 years  Sonographer:          Tanner Delgadillo RDCS Gender:            M                     Additional Staff: Height:            182.00 cm             Admit Date: Weight:            139.00 kg             Admission Status:     Inpatient -                                                                Routine BSA / BMI:         2.55 m2 / 41.96 kg/m2 Department Location:  [facility] Blood Pressure: 152 /77 mmHg Study Type:    TRANSTHORACIC ECHO (TTE) COMPLETE Diagnosis/ICD: Chronic pulmonary embolism-I27.82 Indication:    PE with Edema CPT Codes:     Echo Complete w Full [id number] Patient History: Pertinent History: HTN, Hyperlipidemia, LE Edema, Dyspnea and  Syncope. PE,                    Elevated Troponin. Study Detail: The following Echo studies were performed: 2D, M-Mode, Doppler and               color flow. Technically challenging study due to poor acoustic               windows. Definity used as a contrast agent for endocardial border               definition. Total contrast used for this procedure was 1 mL via IV               push.  PHYSICIAN INTERPRETATION: Left Ventricle: Left ventricular systolic function is normal, with an estimated ejection fraction of 60%. There are no regional wall motion abnormalities. The left ventricular cavity size is normal. Spectral Doppler shows an impaired relaxation pattern of left ventricular diastolic filling. Left Atrium: The left atrium is normal in size. Right Ventricle: The right ventricle is normal in size. There is normal right ventricular global systolic function. Right Atrium: The right atrium is normal in size. Aortic Valve: The aortic valve is trileaflet. There is no evidence of aortic valve regurgitation. The peak instantaneous gradient of the aortic valve is 4.4 mmHg. The mean gradient of the aortic valve is 2.0 mmHg. Mitral Valve: The mitral valve is normal in structure. There is trace mitral valve regurgitation. Tricuspid Valve: The tricuspid valve is structurally normal. There is physiological tricuspid regurgitation. The right ventricular systolic pressure is unable to be estimated. Pulmonic Valve: The pulmonic valve is not well visualized. The pulmonic valve regurgitation was not well visualized. Pericardium: There is no pericardial effusion noted. Aorta: The aortic root is normal.  CONCLUSIONS:  1. Left ventricular systolic function is normal with a 60% estimated ejection fraction.  2. Spectral Doppler shows an impaired relaxation pattern of left ventricular diastolic filling.  3. Trace mitral valve regurgitation.  4. Physiological tricuspid regurgitation. QUANTITATIVE DATA SUMMARY: 2D MEASUREMENTS:                           Normal Ranges: LAs:           3.60 cm   (2.7-4.0cm) IVSd:          0.92 cm   (0.6-1.1cm) LVPWd:         0.79 cm   (0.6-1.1cm) LVIDd:         4.67 cm   (3.9-5.9cm) LVIDs:         3.98 cm LV Mass Index: 51.8 g/m2 LV % FS        14.8 % LA VOLUME:                               Normal Ranges: LA Vol A4C:        58.6 ml    (22+/-6mL/m2) LA Vol A2C:        62.2 ml LA Vol BP:         61.0 ml LA Vol Index A4C:  23.0ml/m2 LA Vol Index A2C:  24.5 ml/m2 LA Vol Index BP:   24.0 ml/m2 LA Area A4C:       19.3 cm2 LA Area A2C:       19.7 cm2 LA Major Axis A4C: 5.4 cm LA Major Axis A2C: 5.3 cm LA Volume Index:   24.0 ml/m2 LA Vol A4C:        55.0 ml LA Vol A2C:        60.0 ml LV SYSTOLIC FUNCTION BY 2D PLANIMETRY (MOD):                     Normal Ranges: EF-A4C View: 60.5 % (>=55%) EF-A2C View: 63.6 % EF-Biplane:  61.0 % LV DIASTOLIC FUNCTION:                        Normal Ranges: MV Peak E:    0.84 m/s (0.7-1.2 m/s) MV Peak A:    0.93 m/s (0.42-0.7 m/s) E/A Ratio:    0.90     (1.0-2.2) MV lateral e' 0.07 m/s MV medial e'  0.05 m/s MITRAL VALVE:                 Normal Ranges: MV DT: 205 msec (150-240msec) AORTIC VALVE:                                   Normal Ranges: AoV Vmax:                1.05 m/s (<=1.7m/s) AoV Peak P.4 mmHg (<20mmHg) AoV Mean P.0 mmHg (1.7-11.5mmHg) LVOT Max Robin:            0.95 m/s (<=1.1m/s) AoV VTI:                 17.50 cm (18-25cm) LVOT VTI:                14.90 cm LVOT Diameter:           2.00 cm  (1.8-2.4cm) AoV Area, VTI:           2.67 cm2 (2.5-5.5cm2) AoV Area,Vmax:           2.83 cm2 (2.5-4.5cm2) AoV Dimensionless Index: 0.85  RIGHT VENTRICLE: RV Basal 3.81 cm RV Mid   3.95 cm RV Major 8.3 cm TAPSE:   25.9 mm RV s'    0.09 m/s TRICUSPID VALVE/RVSP:                   Normal Ranges: IVC Diam: 2.09 cm PULMONIC VALVE:                      Normal Ranges: PV Max Robin: 0.8 m/s  (0.6-0.9m/s) PV Max P.4 mmHg  [id number] Ricardo Cortez MD Electronically signed on  5/16/2024 at 12:44:56 PM  ** Final **     XR foot right 3+ views    Result Date: 5/16/2024  Interpreted By:  Raegan Larson, STUDY: XR FOOT RIGHT 3+ VIEWS 5/15/2024 5:22 pm   INDICATION: Signs/Symptoms:chronic right foot ulcer plantrar   COMPARISON: None available.   ACCESSION NUMBER(S): [id number]   ORDERING CLINICIAN: JAYLENE BANEGAS   TECHNIQUE: AP, lateral common oblique views of the right foot   FINDINGS: Hindfoot osseous structures demonstrate large calcaneal spurring. There is collapse of the talus with component of disorganized architecture and sequela of remote fracture and fragmentation. Of note, there is osseous fusion across the intertarsal articulations and sequela of Charcot arthropathy with fusion across the Lisfranc joint. There is persistent visualization of the calcaneocuboid articulation with eburnation noted as well as in the distribution of the talonavicular articulation. No acute fracture line within the visualized portions of the hindfoot within limits of this plain film. Forefoot metatarsal shafts demonstrate no evidence for fracture. There is postop operative resection 4th digit at the MTP joint. 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 Digits distally demonstrate no evidence for erosions.             1. Osseous fusion across the tarsal and tarsometatarsal articulations with sequela of Charcot arthropathy. Persistent visualization of the calcaneocuboid and talonavicular articulations   2. Skin ulceration plantar margin of the midfoot. However, no cortical erosion by plain film.   Signed by: Raegan Larson 5/16/2024 9:36 AM Dictation workstation:   [id number]    ECG 12 lead    Result Date: 5/15/2024  Sinus tachycardia Otherwise normal ECG No previous ECGs available Confirmed by Kole Durand ([id number]) on 5/15/2024 12:16:24 PM    Vascular US lower extremity venous duplex bilateral    Result Date: 5/14/2024  Interpreted By:  Phil Tejeda, STUDY: VASC US LOWER EXTREMITY VENOUS DUPLEX BILATERAL   5/14/2024 5:10 pm   INDICATION: 52 y/o   M with  Signs/Symptoms:bilateral PEs. LMP:  Unknown.   COMPARISON: None.   ACCESSION NUMBER(S): [id number]   ORDERING CLINICIAN: NICKOLAS BOYKIN   TECHNIQUE: Routine ultrasound of the  bilateral lower extremities was performed with duplex Doppler (color and spectral) evaluation.   Static images were obtained for remote interpretation.     FINDINGS: Right lower extremity: All visualized deep veins in the right lower extremity are patent with no thrombosis. The veins are compressible and demonstrate normal augmentation.   Left lower extremity: The junction of the greater saphenous and common femoral veins is not compressible, which may be due to nonocclusive thrombosis. All other deep veins in the femoropopliteal segment of the left lower extremity are patent and compressible with no evidence of thrombosis. The left calf veins could not be identified.       Questionable nonocclusive thrombosis at the junction of the left greater saphenous vein and common femoral vein.   No DVT in the right lower extremity.   MACRO: None   Signed by: Phil Tejeda 5/14/2024 5:49 PM Dictation workstation:   [id number]    CT angio chest for pulmonary embolism    Result Date: 5/14/2024  Interpreted By:  Cameron Navarro, STUDY: CT ANGIO CHEST FOR PULMONARY EMBOLISM; 5/14/2024 11:16 am   INDICATION: Signs/Symptoms:short of breath, tachycardic, history of PE.   COMPARISON: None   ACCESSION NUMBER(S): [id number]   ORDERING CLINICIAN: NICKOLAS BOYKIN   TECHNIQUE: Contiguous axial images of the thorax were obtained from the level of the thoracic inlet through the lung bases. 3-D MIPS were created, processed and reviewed on a separate workstation. 100 ml of Omnipaque 350 was utilized. All CT examinations are performed with 1 or more of the following dose reduction techniques: Automated exposure control, adjustment of mA and/or kv according to patient's size, or use of iterative reconstruction  techniques.   FINDINGS: The thyroid gland is unremarkable.   There is adequate contrast opacification of the pulmonary arterial vasculature. Extensive filling defects consistent with pulmonary emboli are seen bilaterally. There is prominent pulmonary embolus at the distal main right and distal main left pulmonary emboli with extensive thrombus extending throughout the majority of segmental and subsegmental branches bilaterally. However, no evidence for right heart strain or significant septal deviation.   The heart size is within normal limits.  No pericardial effusion is identified. The aorta and pulmonary arteries are normal in caliber.   There are no pathologically enlarged mediastinal, hilar, or axillary lymph nodes are seen.   The trachea and mainstem bronchi are patent. No suspicious pulmonary nodules are seen. No significant consolidation. There is no evidence of pneumothorax or pleural effusion.   The visualized osseous structures are intact.   Limited images through the upper abdomen are unremarkable.       1. (+) PE.  Extensive bilateral pulmonary emboli. No evidence of right heart strain. 2. No significant airspace opacity or consolidation.     Signed by: Cameron Navarro 5/14/2024 11:49 AM Dictation workstation:   [id number]     Assessment/Plan     Acute respiratory failure secondary to pulmonary embolism, resolved, on room air  Bilateral pulmonary embolism  Type 2 diabetes with peripheral angiopathy without gangrene  Right diabetic foot ulcer, Gan 2  Polymicrobial infection of right mid plantar ulcer-MRSA and Pasteurella  Right foot Charcot deformity  Resolving GARTH        Oxygen as needed  Anticoagulation  Doxycycline to complete total of 14 days-5/31/2024-covers both MRSA and Pasteurella  Local care  Offloading   Monitor temperature and WBC    Andrea June MD

## 2024-05-21 NOTE — PROGRESS NOTES
[facility] met with the pt at bedside, pt has a confirmed appointment with hematology/oncology on 6/11 at 1  pm, pt aware of this but given info again. Pt stating only other need is a ride back to his car which is a short distance away.      05/21/24 1251   Discharge Planning   Patient expects to be discharged to: Home   Does the patient need discharge transport arranged? Yes

## 2024-05-21 NOTE — NURSING NOTE
Inpatient Diabetes Education follow up:    POCT glucose today 5/21/24 at 08:03 at 144 mg/dl and at 11:29 was 228 mg/dl.    No change in diabetes medications.  Getting:  Humalog 12 units TID  Lantus 60 units BID  Humalog 0-15 TID    Will continue to monitor POCT glucose

## 2024-05-21 NOTE — PROGRESS NOTES
Mina Huitron is a 53 y.o. male on day 7 of admission presenting with Acute pulmonary embolism, unspecified pulmonary embolism type, unspecified whether acute cor pulmonale present (Multi).    Per case management patient has a follow-up appointment with Hematology/Oncology 6/11/2024.    Subjective   Patient seen and examined.  Resting in bed in no acute distress.  Awake alert oriented x 3.  No complaints.  No chest pain, shortness of breath.  No fevers chills or sweats.      Objective     Physical Exam  Vitals and nursing note reviewed.   Constitutional:       General: He is not in acute distress.     Appearance: Normal appearance. He is obese. He is not ill-appearing, toxic-appearing or diaphoretic.   HENT:      Head: Normocephalic and atraumatic.      Right Ear: Tympanic membrane normal.      Left Ear: Tympanic membrane normal.      Nose: Nose normal.      Mouth/Throat:      Mouth: Mucous membranes are moist.      Pharynx: Oropharynx is clear.   Eyes:      Extraocular Movements: Extraocular movements intact.      Conjunctiva/sclera: Conjunctivae normal.      Pupils: Pupils are equal, round, and reactive to light.   Cardiovascular:      Rate and Rhythm: Normal rate and regular rhythm.      Pulses: Normal pulses.      Heart sounds: Normal heart sounds. No murmur heard.  Pulmonary:      Effort: Pulmonary effort is normal. No respiratory distress.      Breath sounds: Normal breath sounds. No wheezing, rhonchi or rales.      Comments: Room air.  Abdominal:      General: Bowel sounds are normal. There is no distension.      Palpations: Abdomen is soft.      Tenderness: There is no abdominal tenderness.   Genitourinary:     Comments: Deferred.  Musculoskeletal:         General: Swelling present. No tenderness. Normal range of motion.      Cervical back: Normal range of motion and neck supple.      Right lower leg: Edema present.      Left lower leg: Edema present.   Skin:     General: Skin is warm and dry.       "Capillary Refill: Capillary refill takes less than 2 seconds.      Comments: Right lower extremity foot dressing clean dry intact.   Neurological:      General: No focal deficit present.      Mental Status: He is alert and oriented to person, place, and time.   Psychiatric:         Mood and Affect: Mood normal.         Behavior: Behavior normal.       Last Recorded Vitals  Blood pressure 140/60, pulse 81, temperature 36.4 °C (97.5 °F), temperature source Oral, resp. rate 19, height 1.854 m (6' 1\"), weight 138 kg (304 lb 0.2 oz), SpO2 95%.    Intake/Output last 3 Shifts:  I/O last 3 completed shifts:  In: 600 (4.4 mL/kg) [P.O.:600]  Out: 1000 (7.3 mL/kg) [Urine:1000 (0.2 mL/kg/hr)]  Weight: 137.9 kg     Relevant Results  Results for orders placed or performed during the hospital encounter of 05/14/24 (from the past 24 hour(s))   aPTT   Result Value Ref Range    aPTT 88.8 (H) 22.0 - 32.5 seconds   POCT GLUCOSE   Result Value Ref Range    POCT Glucose 185 (H) 74 - 99 mg/dL   POCT GLUCOSE   Result Value Ref Range    POCT Glucose 235 (H) 74 - 99 mg/dL   aPTT   Result Value Ref Range    aPTT 83.2 (H) 22.0 - 32.5 seconds   CBC and Auto Differential   Result Value Ref Range    WBC 4.9 4.4 - 11.3 x10*3/uL    nRBC 0.0 0.0 - 0.0 /100 WBCs    RBC 4.00 (L) 4.50 - 5.90 x10*6/uL    Hemoglobin 11.4 (L) 13.5 - 17.5 g/dL    Hematocrit 36.3 (L) 41.0 - 52.0 %    MCV 91 80 - 100 fL    MCH 28.5 26.0 - 34.0 pg    MCHC 31.4 (L) 32.0 - 36.0 g/dL    RDW 15.0 (H) 11.5 - 14.5 %    Platelets 168 150 - 450 x10*3/uL    Neutrophils % 48.3 40.0 - 80.0 %    Immature Granulocytes %, Automated 0.4 0.0 - 0.9 %    Lymphocytes % 31.4 13.0 - 44.0 %    Monocytes % 11.4 2.0 - 10.0 %    Eosinophils % 7.9 0.0 - 6.0 %    Basophils % 0.6 0.0 - 2.0 %    Neutrophils Absolute 2.38 1.20 - 7.70 x10*3/uL    Immature Granulocytes Absolute, Automated 0.02 0.00 - 0.70 x10*3/uL    Lymphocytes Absolute 1.55 1.20 - 4.80 x10*3/uL    Monocytes Absolute 0.56 0.10 - 1.00 " x10*3/uL    Eosinophils Absolute 0.39 0.00 - 0.70 x10*3/uL    Basophils Absolute 0.03 0.00 - 0.10 x10*3/uL   aPTT   Result Value Ref Range    aPTT >139.0 (HH) 22.0 - 32.5 seconds   Basic Metabolic Panel   Result Value Ref Range    Glucose 148 (H) 65 - 99 mg/dL    Sodium 135 133 - 145 mmol/L    Potassium 4.2 3.4 - 5.1 mmol/L    Chloride 101 97 - 107 mmol/L    Bicarbonate 23 (L) 24 - 31 mmol/L    Urea Nitrogen 34 (H) 8 - 25 mg/dL    Creatinine 1.30 0.40 - 1.60 mg/dL    eGFR 66 >60 mL/min/1.73m*2    Calcium 8.4 (L) 8.5 - 10.4 mg/dL    Anion Gap 11 <=19 mmol/L   Magnesium   Result Value Ref Range    Magnesium 1.90 1.60 - 3.10 mg/dL   Phosphorus   Result Value Ref Range    Phosphorus 4.0 2.5 - 4.5 mg/dL   POCT GLUCOSE   Result Value Ref Range    POCT Glucose 164 (H) 74 - 99 mg/dL   POCT GLUCOSE   Result Value Ref Range    POCT Glucose 228 (H) 74 - 99 mg/dL     Susceptibility data from last 90 days.  Collected Specimen Info Organism Clindamycin Erythromycin Oxacillin Tetracycline Trimethoprim/Sulfamethoxazole Vancomycin   05/15/24 Tissue/Biopsy from Diabetic Foot Ulcer Methicillin Resistant Staphylococcus aureus (MRSA)  S  R  R  S  S  S     Pasteurella multocida           Mixed Anaerobic Bacteria           Mixed Gram-Positive Bacteria            No results found.    Scheduled medications  apixaban, 10 mg, oral, BID   Followed by  [START ON 5/28/2024] apixaban, 5 mg, oral, BID  doxycycline, 100 mg, oral, q12h NOELLE  [Held by provider] furosemide, 40 mg, oral, Daily  hydrALAZINE, 5 mg, intravenous, Once  insulin glargine, 60 Units, subcutaneous, BID  insulin lispro, 0-15 Units, subcutaneous, TID  insulin lispro, 12 Units, subcutaneous, TID  mupirocin, , Topical, BID  oxygen, , inhalation, Continuous - Inhalation  pantoprazole, 40 mg, oral, Daily  [Held by provider] valsartan, 40 mg, oral, Daily      Continuous medications     PRN medications  PRN medications: acetaminophen, ammonium lactate, carvedilol, dextrose, dextrose,  glucagon, glucagon, guaiFENesin      ASSESSMENT:  Acute deep vein thrombosis  Acute bilateral pulmonary embolism  Acute hypoxic respiratory failure - resolved  Hypertension  Hyperlipidemia  Type 2 diabetes mellitus with hyperglycemia  Anemia chronic disease  Obesity  Chronic diabetic foot ulceration, infection MRSA, Pasteurella multocida    PLAN:  Patient is doing well this afternoon.  No new issues.  Respiratory status, pulse oximetry stable on room air.  Outpatient follow-up appointment scheduled with Hematology-Oncology scheduled 6/11/2024.  Transition IV Heparin to Eliquis treatment dosing.  RX sent to [facility] outpatient pharmacy #30 day supply.  Follow-up with Hematology-Oncology as scheduled for further management.  Podiatry following.  Input appreciated.  Continue wound, dressing care per recommendations: Betadine, calcium alginate ag, dry sterile dressing.  Patient follow-up with [facility] wound care center or follow-up with Dr. Fischer in 1 to 2 weeks.  Minimize weight-bearing to the right foot.  Stable per Podiatry.  Infectious disease following.  P.o Doxycycline total of 14 days per Infectious disease.  P.r.n analgesics.  Tylenol.  Point of care glucose reviewed.  ADA diet.  Continue insulin.  Continue home insulin on discharge.  Diabetes education.  Hypoglycemia protocol.  Outpatient follow-up with primary care provider for blood glucose monitoring.  Blood pressure and heart rate reviewed.  Home medications reviewed.  Discussed with Dr. Cuadra.  Continue Carvedilol, Lasix, Valsartan on discharge.  Outpatient BMP in 1 week.  Monitor blood pressure daily.  Follow-up with primary care provider for blood pressure monitoring and management.  Diet, weight loss, exercise education.  Ambulating.  Fall precautions.  DVT treatment -- Eliquis.  GI prophylaxis.  PPI Protonix.  Discussed with Dr. Cuadra.  Okay to discharge home.        MJ Terry-CNP

## 2024-05-21 NOTE — CARE PLAN
The patient's goals for the shift include      The clinical goals for the shift include stable VS and therapeutic PTT    Problem: Pain  Goal: Takes deep breaths with improved pain control throughout the shift  Outcome: Progressing  Goal: Turns in bed with improved pain control throughout the shift  Outcome: Progressing  Goal: Walks with improved pain control throughout the shift  Outcome: Progressing  Goal: Performs ADL's with improved pain control throughout shift  Outcome: Progressing  Goal: Participates in PT with improved pain control throughout the shift  Outcome: Progressing  Goal: Free from opioid side effects throughout the shift  Outcome: Progressing  Goal: Free from acute confusion related to pain meds throughout the shift  Outcome: Progressing     Problem: Respiratory  Goal: Clear secretions with interventions this shift  Outcome: Progressing  Goal: Minimize anxiety/maximize coping throughout shift  Outcome: Progressing  Goal: Minimal/no exertional discomfort or dyspnea this shift  Outcome: Progressing  Goal: No signs of respiratory distress (eg. Use of accessory muscles. Peds grunting)  Outcome: Progressing  Goal: Patent airway maintained this shift  Outcome: Progressing  Goal: Tolerate mechanical ventilation evidenced by VS/agitation level this shift  Outcome: Progressing  Goal: Tolerate pulmonary toileting this shift  Outcome: Progressing  Goal: Verbalize decreased shortness of breath this shift  Outcome: Progressing  Goal: Wean oxygen to maintain O2 saturation per order/standard this shift  Outcome: Progressing  Goal: Increase self care and/or family involvement in next 24 hours  Outcome: Progressing

## 2024-05-21 NOTE — PROGRESS NOTES
FOLLOWUP FOR: Bilateral pulmonary thromboemboli    SUBJECTIVE  States this is the best he has felt since he has been in the hospital.  He is on room air now.    PHYSICAL EXAM        5/20/2024    11:17 AM 5/20/2024     3:04 PM 5/20/2024     7:46 PM 5/21/2024    12:07 AM 5/21/2024     3:52 AM 5/21/2024     5:33 AM 5/21/2024     7:50 AM   Vitals   Systolic 128 124 103 134 148  144   Diastolic 71 58 55 73 70  61   Heart Rate 86 80 80 77 80  76   Temp 36.4 °C (97.5 °F) 36.3 °C (97.3 °F) 36.6 °C (97.9 °F) 36.4 °C (97.5 °F) 36.4 °C (97.5 °F)  36.4 °C (97.5 °F)   Resp 16 18 18 21 22  18   Weight (lb)     304.01 304.01    BMI     40.11 kg/m2 40.11 kg/m2    BSA (m2)     2.67 m2 2.67 m2        Vital signs reviewed   NAD, awake and alert, obese   Lungs Symmetric excursions.  Auscultation - diminished but clear, no wheezing/rales/rhonchi.     Cor RSR No murmur, rub, gallop   Abd soft and nontender, no rebound or distention, no HS megaly   Ext venous stasis changes, lymphedema   Neuro no focal deficits.  moves all extremities symmetrically.  speech normal.  affect normal    LABS    Serum chemistries are unremarkable except for glucose 268.  CBC is unremarkable      ASSESSMENT:    .  Bilateral pulmonary embolism, DVT  .  Acute hypoxia  .  Obesity    PLAN    .  Anticoagulation per heme-onc.  Patient is having issues with affording his medications.  Will likely be transition to oral Eliquis but will defer to hematology and primary service.  .  Will sign off at this time.  Thank you    Gigi Verde MD, [facility]

## 2024-05-21 NOTE — NURSING NOTE
VSS, pt denies any needs at this time, bed locked and low position. Belongings in reach. Call light in reach. Plan of care ongoing.

## 2024-05-21 NOTE — NURSING NOTE
Discharge instructions reviewed with patient by discharge RN, tele removed, PIV out, home meds delivered to bedside.  Patient ambulated to lobby in stable condition with belongings.

## 2024-05-27 NOTE — DISCHARGE SUMMARY
Discharge Diagnosis  Acute deep vein thrombosis  Acute bilateral pulmonary embolism  Acute hypoxic respiratory failure - resolved  Hypertension  Hyperlipidemia  Type 2 diabetes mellitus with hyperglycemia  Anemia chronic disease  Obesity  Chronic diabetic foot ulceration, infection MRSA, Pasteurella multocida    Issues Requiring Follow-Up  Above    Discharge Meds     Your medication list        START taking these medications        Instructions Last Dose Given Next Dose Due   acetaminophen 325 mg tablet  Commonly known as: Tylenol      Take 2 tablets (650 mg) by mouth every 6 hours if needed for mild pain (1 - 3).       ammonium lactate 12 % lotion  Commonly known as: Lac-Hydrin      Apply topically every 1 hour if needed for dry skin.       apixaban 5 mg (74 tabs) tablet  Commonly known as: Eliquis  Start taking on: May 21, 2024      Take 2 tablets (10 mg) by mouth 2 times a day for 7 days, Then take 1 tablet (5 mg) 2 times a day thereafter       doxycycline 100 mg capsule  Commonly known as: Vibramycin      Take 1 capsule (100 mg) by mouth every 12 hours for 10 days. Take with at least 8 ounces (large glass) of water, do not lie down for 30 minutes after       omeprazole 20 mg DR capsule  Commonly known as: PriLOSEC      Take 1 capsule (20 mg) by mouth once daily. Do not chew, crush or split              CHANGE how you take these medications        Instructions Last Dose Given Next Dose Due   carvedilol 12.5 mg tablet  Commonly known as: Coreg  What changed:   when to take this  reasons to take this      Take 1 tablet (12.5 mg) by mouth 2 times daily (morning and late afternoon).              CONTINUE taking these medications        Instructions Last Dose Given Next Dose Due   furosemide 20 mg tablet  Commonly known as: Lasix           insulin NPH (Isophane) 100 unit/mL injection  Commonly known as: HumuLIN N,NovoLIN N           insulin regular 100 unit/mL injection  Commonly known as: HumuLIN R,NovoLIN R            valsartan 40 mg tablet  Commonly known as: Diovan                  STOP taking these medications      HumaLOG KwikPen Insulin 100 unit/mL injection  Generic drug: insulin lispro        Lantus Solostar U-100 Insulin 100 unit/mL (3 mL) pen  Generic drug: insulin glargine                  Where to Get Your Medications        These medications were sent to [facility]  [id number] [place] [address]      Hours: 9 AM to 6 PM Mon-Fri, 9 AM to 1 PM Sat Phone: [phone]   apixaban 5 mg (74 tabs) tablet  doxycycline 100 mg capsule  omeprazole 20 mg DR capsule       Information about where to get these medications is not yet available    Ask your nurse or doctor about these medications  acetaminophen 325 mg tablet  ammonium lactate 12 % lotion  carvedilol 12.5 mg tablet         Test Results Pending At Discharge  Pending Labs       No current pending labs.            Hospital Course  This is a very pleasant 53 year old obese  male with a past medical history significant for DVT/PE treated with Warfarin x 6 months, presenting to the emergency department with shortness of breath.  In the emergency department, initial work-up was done.  He was hypoxic, diagnosed with DVT and bilateral pulmonary embolism.  PERT team was consulted, recommended Heparin drip and ICU monitoring.  He was admitted to the ICU.  He was treated with IV Heparin per protocol.  He was treated with oxygen.  He was evaluated and treated by Podiatry for a right diabetic foot ulcer.  He was treated with local skin care.  A wound culture was sent.  He was transferred to the monitored care unit.  He was evaluated and treated by Hematology-Oncology and Pulmonology.  He was treated with IV Heparin and oxygen.  He was cleared for discharge on  Eliquis treatment dosing - RX filled with outpatient pharmacy with follow-up with Hematology-Oncology to assist with medication management - appointment scheduled 6/11/2024.  The wound  culture grew MRSA, Pasteurella multocida.  He was evaluated and treated by Infectious disease.  He was treated with antibiotics.  He was cleared by Infectious disease for discharge on oral antibiotics Doxycycline total of 14 days and local care per Podiatry: Betadine, calcium alginate ag, dry sterile dressing with outpatient follow-up at the [facility] wound care center or follow-up with Podiatry Dr. Fischer in 1-2 weeks.  He was advised minimize weight-bearing to the right foot and no driving until cleared by the wound care center provider / Podiatry or primary care provider.     Pertinent Physical Exam At Time of Discharge  See physical examination.    Outpatient Follow-Up  Future Appointments   Date Time Provider Department Center   6/11/2024  1:00 PM Alan Watts MD [id number]     Follow-up with Hematology-Oncology 6/22/2024.    Follow-up with primary care provider in 1 week.    Follow-up with [facility] wound care center / Podiatry Dr. Fischer 1-2 weeks.      Rosalva Sinha, APRN-CNP

## 2024-06-04 ENCOUNTER — TELEPHONE (OUTPATIENT)
Dept: HEMATOLOGY/ONCOLOGY | Facility: CLINIC | Age: 54
End: 2024-06-04

## 2024-06-10 ENCOUNTER — TELEPHONE (OUTPATIENT)
Dept: HEMATOLOGY/ONCOLOGY | Facility: CLINIC | Age: 54
End: 2024-06-10

## 2024-06-11 ENCOUNTER — APPOINTMENT (OUTPATIENT)
Dept: HEMATOLOGY/ONCOLOGY | Facility: CLINIC | Age: 54
End: 2024-06-11

## 2024-06-11 DIAGNOSIS — I26.99 ACUTE PULMONARY EMBOLISM, UNSPECIFIED PULMONARY EMBOLISM TYPE, UNSPECIFIED WHETHER ACUTE COR PULMONALE PRESENT (MULTI): Primary | ICD-10-CM

## 2024-06-12 NOTE — DOCUMENTATION CLARIFICATION NOTE
PATIENT:               JAYLENE VARELA  ACCT #:                  [id number]  MRN:                       [id number]  :                       1970  ADMIT DATE:       2024 9:42 AM  DISCH DATE:        2024 4:00 PM  RESPONDING PROVIDER #:        [id number]          PROVIDER RESPONSE TEXT:    Excisional debridement to and including skin    CDI QUERY TEXT:    Clarification        Instruction:    Based on your assessment of the patient and the clinical information, please provide the requested documentation by clicking on the appropriate radio button and enter any additional information if prompted.    Question: Please further clarify the debridement procedure as    When answering this query, please exercise your independent professional judgment. The fact that a question is being asked, does not imply that any particular answer is desired or expected.    The patient's clinical indicators include:  Jaylene Varela is a 53 y.o. 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 with shortness of breath for couple days.  Workup revealed bilateral pulmonary embolism    Clinical Information: This query refers to the procedure performed on 5/15 and documented as Debridement of chronic right foot plantar ulceration/diabetic in the setting of Charcot neuroarthropathy    -Significant hyperkeratotic buildup and fibrotic tissue noted.  This was debrided sharply with a #15 blade with care taken to not debride any more than hyperkeratotic tissue secondary to patient's active anticoagulation status.  Wound was cultured following sharp excisional debridement secondary to remarkable malodor.  Painted with Betadine.  Dry sterile dressing applied.  Will follow wound cultures  and will recommend antibiotic therapy and  infectious disease consultation if there is bacterial growth that results.  Options provided:  -- Excisional debridement to and including skin  -- Excisional debridement down to and including subcutaneous tissue and fascia  -- Excisional debridement down to and including muscle  -- Excisional debridement down to and including bone, Please specify bone involved below  -- Non-excisional debridement to and including skin  -- Non-excisional debridement down to and including subcutaneous tissue and fascia  -- Non-excisional debridement down to and including muscle  -- Non-excisional debridement down to and including bone, Please specify bone involved below  -- Other - I will add my own diagnosis  -- Refer to Clinical Documentation Reviewer    Query created by: Jana Sarabia on 6/4/2024 12:37 PM      Electronically signed by:  JAYLENE BANEGAS DPM 6/12/2024 12:52 PM

## 2025-06-09 ENCOUNTER — HOSPITAL ENCOUNTER (OUTPATIENT)
Facility: HOSPITAL | Age: 55
Discharge: HOME | End: 2025-06-09
Attending: INTERNAL MEDICINE | Admitting: INTERNAL MEDICINE

## 2025-06-09 ENCOUNTER — HOSPITAL ENCOUNTER (EMERGENCY)
Facility: HOSPITAL | Age: 55
Discharge: STILL ADMITTED OR ADMINISTRATIVE | End: 2025-06-09

## 2025-06-09 VITALS
OXYGEN SATURATION: 97 % | SYSTOLIC BLOOD PRESSURE: 145 MMHG | HEART RATE: 79 BPM | TEMPERATURE: 97.9 F | RESPIRATION RATE: 15 BRPM | DIASTOLIC BLOOD PRESSURE: 95 MMHG

## 2025-06-09 PROCEDURE — 4500999001 HC ED NO CHARGE

## 2025-06-09 ASSESSMENT — PAIN SCALES - GENERAL: PAINLEVEL_OUTOF10: 7

## 2025-06-09 NOTE — SIGNIFICANT EVENT
Received call via the transfer center, in short patient presenting to [facility] emergency department reportedly for laboratory abnormalities in the setting of recently diagnosed acute lymphoblastic leukemia.  Per discussion with the ED physician at [facility], patient's oncologist (Dr. Jayne Parks) refer the patient to the ED for ambulance transfer to [facility].  Patient stable at time of transfer, patient is being sent for oncology evaluation, I did request the transferring physician discussed case with malignant hematology prior to transfer to optimize their evaluation of the patient.